# Patient Record
Sex: MALE | Race: WHITE | Employment: FULL TIME | URBAN - METROPOLITAN AREA
[De-identification: names, ages, dates, MRNs, and addresses within clinical notes are randomized per-mention and may not be internally consistent; named-entity substitution may affect disease eponyms.]

---

## 2017-01-07 ENCOUNTER — APPOINTMENT (EMERGENCY)
Dept: RADIOLOGY | Facility: HOSPITAL | Age: 42
End: 2017-01-07
Payer: COMMERCIAL

## 2017-01-07 ENCOUNTER — HOSPITAL ENCOUNTER (EMERGENCY)
Facility: HOSPITAL | Age: 42
Discharge: HOME | End: 2017-01-07
Attending: EMERGENCY MEDICINE
Payer: COMMERCIAL

## 2017-01-07 VITALS
SYSTOLIC BLOOD PRESSURE: 122 MMHG | HEART RATE: 88 BPM | TEMPERATURE: 98.6 F | OXYGEN SATURATION: 97 % | DIASTOLIC BLOOD PRESSURE: 55 MMHG | RESPIRATION RATE: 16 BRPM

## 2017-01-07 DIAGNOSIS — R07.9 CHEST PAIN: Primary | ICD-10-CM

## 2017-01-07 LAB
ANION GAP SERPL CALCULATED.3IONS-SCNC: 7 MMOL/L (ref 4–13)
ATRIAL RATE: 89 BPM
BASOPHILS # BLD AUTO: 0.03 THOUSANDS/ΜL (ref 0–0.1)
BASOPHILS NFR BLD AUTO: 0 % (ref 0–1)
BUN SERPL-MCNC: 9 MG/DL (ref 5–25)
CALCIUM SERPL-MCNC: 9.1 MG/DL (ref 8.3–10.1)
CHLORIDE SERPL-SCNC: 104 MMOL/L (ref 100–108)
CO2 SERPL-SCNC: 27 MMOL/L (ref 21–32)
CREAT SERPL-MCNC: 0.78 MG/DL (ref 0.6–1.3)
EOSINOPHIL # BLD AUTO: 0.12 THOUSAND/ΜL (ref 0–0.61)
EOSINOPHIL NFR BLD AUTO: 1 % (ref 0–6)
ERYTHROCYTE [DISTWIDTH] IN BLOOD BY AUTOMATED COUNT: 13.3 % (ref 11.6–15.1)
GFR SERPL CREATININE-BSD FRML MDRD: >60 ML/MIN/1.73SQ M
GLUCOSE SERPL-MCNC: 146 MG/DL (ref 65–140)
HCT VFR BLD AUTO: 42 % (ref 36.5–49.3)
HGB BLD-MCNC: 14.3 G/DL (ref 12–17)
LYMPHOCYTES # BLD AUTO: 2.42 THOUSANDS/ΜL (ref 0.6–4.47)
LYMPHOCYTES NFR BLD AUTO: 29 % (ref 14–44)
MCH RBC QN AUTO: 30.6 PG (ref 26.8–34.3)
MCHC RBC AUTO-ENTMCNC: 34 G/DL (ref 31.4–37.4)
MCV RBC AUTO: 90 FL (ref 82–98)
MONOCYTES # BLD AUTO: 0.57 THOUSAND/ΜL (ref 0.17–1.22)
MONOCYTES NFR BLD AUTO: 7 % (ref 4–12)
NEUTROPHILS # BLD AUTO: 5.32 THOUSANDS/ΜL (ref 1.85–7.62)
NEUTS SEG NFR BLD AUTO: 63 % (ref 43–75)
NRBC BLD AUTO-RTO: 0 /100 WBCS
P AXIS: 56 DEGREES
PLATELET # BLD AUTO: 203 THOUSANDS/UL (ref 149–390)
PMV BLD AUTO: 10.7 FL (ref 8.9–12.7)
POTASSIUM SERPL-SCNC: 3.7 MMOL/L (ref 3.5–5.3)
PR INTERVAL: 194 MS
QRS AXIS: -71 DEGREES
QRSD INTERVAL: 112 MS
QT INTERVAL: 368 MS
QTC INTERVAL: 442 MS
RBC # BLD AUTO: 4.67 MILLION/UL (ref 3.88–5.62)
SODIUM SERPL-SCNC: 138 MMOL/L (ref 136–145)
SPECIMEN SOURCE: NORMAL
T WAVE AXIS: 76 DEGREES
TROPONIN I BLD-MCNC: 0 NG/ML (ref 0–0.08)
VENTRICULAR RATE: 87 BPM
WBC # BLD AUTO: 8.49 THOUSAND/UL (ref 4.31–10.16)

## 2017-01-07 PROCEDURE — 93005 ELECTROCARDIOGRAM TRACING: CPT

## 2017-01-07 PROCEDURE — 80048 BASIC METABOLIC PNL TOTAL CA: CPT

## 2017-01-07 PROCEDURE — 36415 COLL VENOUS BLD VENIPUNCTURE: CPT

## 2017-01-07 PROCEDURE — 84484 ASSAY OF TROPONIN QUANT: CPT

## 2017-01-07 PROCEDURE — 71275 CT ANGIOGRAPHY CHEST: CPT

## 2017-01-07 PROCEDURE — 85025 COMPLETE CBC W/AUTO DIFF WBC: CPT

## 2017-01-07 PROCEDURE — 71020 HB CHEST X-RAY 2VW FRONTAL&LATL: CPT

## 2017-01-07 PROCEDURE — 99285 EMERGENCY DEPT VISIT HI MDM: CPT

## 2017-01-07 PROCEDURE — 74175 CTA ABDOMEN W/CONTRAST: CPT

## 2017-01-07 RX ORDER — NAPROXEN 500 MG/1
500 TABLET ORAL 2 TIMES DAILY WITH MEALS
Qty: 14 TABLET | Refills: 0 | Status: SHIPPED | OUTPATIENT
Start: 2017-01-07 | End: 2020-01-07 | Stop reason: ALTCHOICE

## 2017-01-07 RX ADMIN — IOHEXOL 100 ML: 350 INJECTION, SOLUTION INTRAVENOUS at 15:43

## 2020-01-14 PROCEDURE — 87070 CULTURE OTHR SPECIMN AEROBIC: CPT | Performed by: OTOLARYNGOLOGY

## 2020-01-14 PROCEDURE — 87147 CULTURE TYPE IMMUNOLOGIC: CPT | Performed by: OTOLARYNGOLOGY

## 2020-01-14 PROCEDURE — 87205 SMEAR GRAM STAIN: CPT | Performed by: OTOLARYNGOLOGY

## 2020-01-14 PROCEDURE — 87077 CULTURE AEROBIC IDENTIFY: CPT | Performed by: OTOLARYNGOLOGY

## 2020-01-14 PROCEDURE — 87186 SC STD MICRODIL/AGAR DIL: CPT | Performed by: OTOLARYNGOLOGY

## 2021-03-30 DIAGNOSIS — Z23 ENCOUNTER FOR IMMUNIZATION: ICD-10-CM

## 2021-06-09 PROCEDURE — 87102 FUNGUS ISOLATION CULTURE: CPT | Performed by: OTOLARYNGOLOGY

## 2021-06-09 PROCEDURE — 87205 SMEAR GRAM STAIN: CPT | Performed by: OTOLARYNGOLOGY

## 2021-06-09 PROCEDURE — 87186 SC STD MICRODIL/AGAR DIL: CPT | Performed by: OTOLARYNGOLOGY

## 2021-06-09 PROCEDURE — 87070 CULTURE OTHR SPECIMN AEROBIC: CPT | Performed by: OTOLARYNGOLOGY

## 2021-06-09 PROCEDURE — 87107 FUNGI IDENTIFICATION MOLD: CPT | Performed by: OTOLARYNGOLOGY

## 2024-11-01 ENCOUNTER — HOSPITAL ENCOUNTER (INPATIENT)
Facility: HOSPITAL | Age: 49
LOS: 3 days | Discharge: HOME | DRG: 552 | End: 2024-11-04
Attending: EMERGENCY MEDICINE | Admitting: INTERNAL MEDICINE
Payer: COMMERCIAL

## 2024-11-01 ENCOUNTER — APPOINTMENT (EMERGENCY)
Dept: RADIOLOGY | Facility: HOSPITAL | Age: 49
DRG: 552 | End: 2024-11-01
Payer: COMMERCIAL

## 2024-11-01 DIAGNOSIS — M54.50 LOW BACK PAIN: ICD-10-CM

## 2024-11-01 DIAGNOSIS — M51.27 LUMBOSACRAL DISC HERNIATION: Primary | ICD-10-CM

## 2024-11-01 DIAGNOSIS — M54.16 LUMBAR RADICULOPATHY: ICD-10-CM

## 2024-11-01 PROBLEM — E78.5 DYSLIPIDEMIA: Status: ACTIVE | Noted: 2024-11-01

## 2024-11-01 PROBLEM — E11.9 DIABETES MELLITUS TYPE 2, CONTROLLED (HCC): Status: ACTIVE | Noted: 2024-11-01

## 2024-11-01 LAB
ALBUMIN SERPL BCG-MCNC: 4.3 G/DL (ref 3.5–5)
ALP SERPL-CCNC: 68 U/L (ref 34–104)
ALT SERPL W P-5'-P-CCNC: 19 U/L (ref 7–52)
ANION GAP SERPL CALCULATED.3IONS-SCNC: 7 MMOL/L (ref 4–13)
AST SERPL W P-5'-P-CCNC: 13 U/L (ref 13–39)
ATRIAL RATE: 69 BPM
BASOPHILS # BLD AUTO: 0.05 THOUSANDS/ΜL (ref 0–0.1)
BASOPHILS NFR BLD AUTO: 1 % (ref 0–1)
BILIRUB SERPL-MCNC: 0.45 MG/DL (ref 0.2–1)
BNP SERPL-MCNC: 47 PG/ML (ref 0–100)
BUN SERPL-MCNC: 20 MG/DL (ref 5–25)
CALCIUM SERPL-MCNC: 8.9 MG/DL (ref 8.4–10.2)
CARDIAC TROPONIN I PNL SERPL HS: <2 NG/L
CHLORIDE SERPL-SCNC: 103 MMOL/L (ref 96–108)
CO2 SERPL-SCNC: 25 MMOL/L (ref 21–32)
CREAT SERPL-MCNC: 0.66 MG/DL (ref 0.6–1.3)
EOSINOPHIL # BLD AUTO: 0.08 THOUSAND/ΜL (ref 0–0.61)
EOSINOPHIL NFR BLD AUTO: 1 % (ref 0–6)
ERYTHROCYTE [DISTWIDTH] IN BLOOD BY AUTOMATED COUNT: 12.8 % (ref 11.6–15.1)
EST. AVERAGE GLUCOSE BLD GHB EST-MCNC: 163 MG/DL
GFR SERPL CREATININE-BSD FRML MDRD: 111 ML/MIN/1.73SQ M
GLUCOSE SERPL-MCNC: 154 MG/DL (ref 65–140)
GLUCOSE SERPL-MCNC: 158 MG/DL (ref 65–140)
GLUCOSE SERPL-MCNC: 215 MG/DL (ref 65–140)
HBA1C MFR BLD: 7.3 %
HCT VFR BLD AUTO: 39.8 % (ref 36.5–49.3)
HGB BLD-MCNC: 13.6 G/DL (ref 12–17)
HOLD SPECIMEN: NORMAL
IMM GRANULOCYTES # BLD AUTO: 0.02 THOUSAND/UL (ref 0–0.2)
IMM GRANULOCYTES NFR BLD AUTO: 0 % (ref 0–2)
LYMPHOCYTES # BLD AUTO: 1.69 THOUSANDS/ΜL (ref 0.6–4.47)
LYMPHOCYTES NFR BLD AUTO: 24 % (ref 14–44)
MCH RBC QN AUTO: 31 PG (ref 26.8–34.3)
MCHC RBC AUTO-ENTMCNC: 34.2 G/DL (ref 31.4–37.4)
MCV RBC AUTO: 91 FL (ref 82–98)
MONOCYTES # BLD AUTO: 0.47 THOUSAND/ΜL (ref 0.17–1.22)
MONOCYTES NFR BLD AUTO: 7 % (ref 4–12)
NEUTROPHILS # BLD AUTO: 4.87 THOUSANDS/ΜL (ref 1.85–7.62)
NEUTS SEG NFR BLD AUTO: 67 % (ref 43–75)
NRBC BLD AUTO-RTO: 0 /100 WBCS
P AXIS: 58 DEGREES
PLATELET # BLD AUTO: 186 THOUSANDS/UL (ref 149–390)
PLATELET # BLD AUTO: 193 THOUSANDS/UL (ref 149–390)
PMV BLD AUTO: 10.3 FL (ref 8.9–12.7)
PMV BLD AUTO: 9.9 FL (ref 8.9–12.7)
POTASSIUM SERPL-SCNC: 4.1 MMOL/L (ref 3.5–5.3)
PR INTERVAL: 232 MS
PROT SERPL-MCNC: 7 G/DL (ref 6.4–8.4)
QRS AXIS: -64 DEGREES
QRSD INTERVAL: 110 MS
QT INTERVAL: 398 MS
QTC INTERVAL: 426 MS
RBC # BLD AUTO: 4.39 MILLION/UL (ref 3.88–5.62)
SODIUM SERPL-SCNC: 135 MMOL/L (ref 135–147)
T WAVE AXIS: 44 DEGREES
VENTRICULAR RATE: 69 BPM
WBC # BLD AUTO: 7.18 THOUSAND/UL (ref 4.31–10.16)

## 2024-11-01 PROCEDURE — 85049 AUTOMATED PLATELET COUNT: CPT | Performed by: INTERNAL MEDICINE

## 2024-11-01 PROCEDURE — 74174 CTA ABD&PLVS W/CONTRAST: CPT

## 2024-11-01 PROCEDURE — 99285 EMERGENCY DEPT VISIT HI MDM: CPT | Performed by: EMERGENCY MEDICINE

## 2024-11-01 PROCEDURE — 93010 ELECTROCARDIOGRAM REPORT: CPT | Performed by: INTERNAL MEDICINE

## 2024-11-01 PROCEDURE — 83036 HEMOGLOBIN GLYCOSYLATED A1C: CPT | Performed by: INTERNAL MEDICINE

## 2024-11-01 PROCEDURE — 84484 ASSAY OF TROPONIN QUANT: CPT | Performed by: INTERNAL MEDICINE

## 2024-11-01 PROCEDURE — 71275 CT ANGIOGRAPHY CHEST: CPT

## 2024-11-01 PROCEDURE — 80053 COMPREHEN METABOLIC PANEL: CPT

## 2024-11-01 PROCEDURE — 83880 ASSAY OF NATRIURETIC PEPTIDE: CPT

## 2024-11-01 PROCEDURE — 82948 REAGENT STRIP/BLOOD GLUCOSE: CPT

## 2024-11-01 PROCEDURE — 99284 EMERGENCY DEPT VISIT MOD MDM: CPT

## 2024-11-01 PROCEDURE — 36415 COLL VENOUS BLD VENIPUNCTURE: CPT

## 2024-11-01 PROCEDURE — 99223 1ST HOSP IP/OBS HIGH 75: CPT | Performed by: INTERNAL MEDICINE

## 2024-11-01 PROCEDURE — 93005 ELECTROCARDIOGRAM TRACING: CPT

## 2024-11-01 PROCEDURE — 85025 COMPLETE CBC W/AUTO DIFF WBC: CPT

## 2024-11-01 PROCEDURE — 84484 ASSAY OF TROPONIN QUANT: CPT

## 2024-11-01 PROCEDURE — 96376 TX/PRO/DX INJ SAME DRUG ADON: CPT

## 2024-11-01 PROCEDURE — 96374 THER/PROPH/DIAG INJ IV PUSH: CPT

## 2024-11-01 RX ORDER — ACETAMINOPHEN 325 MG/1
975 TABLET ORAL EVERY 8 HOURS SCHEDULED
Status: DISCONTINUED | OUTPATIENT
Start: 2024-11-01 | End: 2024-11-04 | Stop reason: HOSPADM

## 2024-11-01 RX ORDER — HYDROMORPHONE HCL/PF 1 MG/ML
0.5 SYRINGE (ML) INJECTION ONCE
Status: COMPLETED | OUTPATIENT
Start: 2024-11-01 | End: 2024-11-01

## 2024-11-01 RX ORDER — METHOCARBAMOL 750 MG/1
750 TABLET, FILM COATED ORAL EVERY 8 HOURS SCHEDULED
Status: DISCONTINUED | OUTPATIENT
Start: 2024-11-01 | End: 2024-11-03

## 2024-11-01 RX ORDER — METHYLPREDNISOLONE 4 MG/1
16 TABLET ORAL DAILY
Status: COMPLETED | OUTPATIENT
Start: 2024-11-03 | End: 2024-11-03

## 2024-11-01 RX ORDER — ONDANSETRON 2 MG/ML
4 INJECTION INTRAMUSCULAR; INTRAVENOUS EVERY 6 HOURS PRN
Status: DISCONTINUED | OUTPATIENT
Start: 2024-11-01 | End: 2024-11-04 | Stop reason: HOSPADM

## 2024-11-01 RX ORDER — HYDROMORPHONE HCL/PF 1 MG/ML
1 SYRINGE (ML) INJECTION EVERY 4 HOURS PRN
Status: DISCONTINUED | OUTPATIENT
Start: 2024-11-01 | End: 2024-11-04

## 2024-11-01 RX ORDER — METHYLPREDNISOLONE 4 MG/1
4 TABLET ORAL DAILY
Status: DISCONTINUED | OUTPATIENT
Start: 2024-11-06 | End: 2024-11-04 | Stop reason: HOSPADM

## 2024-11-01 RX ORDER — INSULIN GLARGINE 100 [IU]/ML
10 INJECTION, SOLUTION SUBCUTANEOUS
Status: DISCONTINUED | OUTPATIENT
Start: 2024-11-01 | End: 2024-11-01

## 2024-11-01 RX ORDER — INSULIN GLARGINE 100 [IU]/ML
15 INJECTION, SOLUTION SUBCUTANEOUS
Status: DISCONTINUED | OUTPATIENT
Start: 2024-11-01 | End: 2024-11-04 | Stop reason: HOSPADM

## 2024-11-01 RX ORDER — INSULIN LISPRO 100 [IU]/ML
1-5 INJECTION, SOLUTION INTRAVENOUS; SUBCUTANEOUS
Status: DISCONTINUED | OUTPATIENT
Start: 2024-11-01 | End: 2024-11-02

## 2024-11-01 RX ORDER — ATORVASTATIN CALCIUM 10 MG/1
10 TABLET, FILM COATED ORAL
Status: DISCONTINUED | OUTPATIENT
Start: 2024-11-01 | End: 2024-11-04 | Stop reason: HOSPADM

## 2024-11-01 RX ORDER — METHYLPREDNISOLONE 4 MG/1
8 TABLET ORAL DAILY
Status: DISCONTINUED | OUTPATIENT
Start: 2024-11-05 | End: 2024-11-04 | Stop reason: HOSPADM

## 2024-11-01 RX ORDER — LIDOCAINE 50 MG/G
1 PATCH TOPICAL DAILY
Status: DISCONTINUED | OUTPATIENT
Start: 2024-11-01 | End: 2024-11-04 | Stop reason: HOSPADM

## 2024-11-01 RX ORDER — ASPIRIN 81 MG/1
81 TABLET ORAL DAILY
Status: DISCONTINUED | OUTPATIENT
Start: 2024-11-01 | End: 2024-11-04 | Stop reason: HOSPADM

## 2024-11-01 RX ORDER — ENOXAPARIN SODIUM 100 MG/ML
40 INJECTION SUBCUTANEOUS DAILY
Status: DISCONTINUED | OUTPATIENT
Start: 2024-11-01 | End: 2024-11-04 | Stop reason: HOSPADM

## 2024-11-01 RX ORDER — METHYLPREDNISOLONE 4 MG/1
20 TABLET ORAL DAILY
Status: COMPLETED | OUTPATIENT
Start: 2024-11-02 | End: 2024-11-02

## 2024-11-01 RX ORDER — OXYCODONE HYDROCHLORIDE 5 MG/1
5 TABLET ORAL EVERY 4 HOURS PRN
Status: DISCONTINUED | OUTPATIENT
Start: 2024-11-01 | End: 2024-11-02

## 2024-11-01 RX ORDER — METHYLPREDNISOLONE 4 MG/1
12 TABLET ORAL DAILY
Status: COMPLETED | OUTPATIENT
Start: 2024-11-04 | End: 2024-11-04

## 2024-11-01 RX ADMIN — IOHEXOL 100 ML: 350 INJECTION, SOLUTION INTRAVENOUS at 10:04

## 2024-11-01 RX ADMIN — ENOXAPARIN SODIUM 40 MG: 40 INJECTION SUBCUTANEOUS at 13:04

## 2024-11-01 RX ADMIN — HYDROMORPHONE HYDROCHLORIDE 1 MG: 1 INJECTION, SOLUTION INTRAMUSCULAR; INTRAVENOUS; SUBCUTANEOUS at 17:33

## 2024-11-01 RX ADMIN — Medication 7.5 MG: at 15:33

## 2024-11-01 RX ADMIN — METHOCARBAMOL 750 MG: 750 TABLET ORAL at 21:28

## 2024-11-01 RX ADMIN — ASPIRIN 81 MG: 81 TABLET, COATED ORAL at 13:03

## 2024-11-01 RX ADMIN — LIDOCAINE 1 PATCH: 50 PATCH TOPICAL at 13:04

## 2024-11-01 RX ADMIN — ATORVASTATIN CALCIUM 10 MG: 10 TABLET, FILM COATED ORAL at 16:15

## 2024-11-01 RX ADMIN — METHOCARBAMOL 750 MG: 750 TABLET ORAL at 13:03

## 2024-11-01 RX ADMIN — ACETAMINOPHEN 975 MG: 325 TABLET, FILM COATED ORAL at 13:03

## 2024-11-01 RX ADMIN — ONDANSETRON 4 MG: 2 INJECTION INTRAMUSCULAR; INTRAVENOUS at 21:35

## 2024-11-01 RX ADMIN — INSULIN LISPRO 1 UNITS: 100 INJECTION, SOLUTION INTRAVENOUS; SUBCUTANEOUS at 13:04

## 2024-11-01 RX ADMIN — HYDROMORPHONE HYDROCHLORIDE 0.5 MG: 1 INJECTION, SOLUTION INTRAMUSCULAR; INTRAVENOUS; SUBCUTANEOUS at 11:22

## 2024-11-01 RX ADMIN — HYDROMORPHONE HYDROCHLORIDE 0.5 MG: 1 INJECTION, SOLUTION INTRAMUSCULAR; INTRAVENOUS; SUBCUTANEOUS at 09:52

## 2024-11-01 RX ADMIN — ACETAMINOPHEN 975 MG: 325 TABLET, FILM COATED ORAL at 21:28

## 2024-11-01 RX ADMIN — INSULIN GLARGINE 15 UNITS: 100 INJECTION, SOLUTION SUBCUTANEOUS at 21:28

## 2024-11-01 RX ADMIN — Medication 7.5 MG: at 21:28

## 2024-11-01 RX ADMIN — CHOLECALCIFEROL TAB 10 MCG (400 UNIT) 400 UNITS: 10 TAB at 16:15

## 2024-11-01 RX ADMIN — METHYLPREDNISOLONE 24 MG: 16 TABLET ORAL at 16:15

## 2024-11-01 NOTE — ASSESSMENT & PLAN NOTE
Patient presented with severe back pain and ambulatory dysfunction  No falls or trauma  Pain radiating to bilateral thigh R>L  CT scan with Moderate L5-S1 left paracentral disc protrusion abutting and posteriorly displacing the descending left S1 nerve root.    Admit patient to the hospital  Check lumbar spine MRI  Consult neurosurgery  PT OT eval  Pain control with muscle relaxants  Start Medrol Dosepak

## 2024-11-01 NOTE — ED ATTENDING ATTESTATION
11/1/2024  I, Yamileth Hernandez MD, saw and evaluated the patient. I have discussed the patient with the resident/non-physician practitioner and agree with the resident's/non-physician practitioner's findings, Plan of Care, and MDM as documented in the resident's/non-physician practitioner's note, except where noted. All available labs and Radiology studies were reviewed.  I was present for key portions of any procedure(s) performed by the resident/non-physician practitioner and I was immediately available to provide assistance.       At this point I agree with the current assessment done in the Emergency Department.  I have conducted an independent evaluation of this patient a history and physical is as follows:    OA: 48 y/o m with h/o DM with h/o bicuspid aortic valve s/p repair who presents with acute onset of lower back pain since Monday. Pain has intensified since that time with radiation to bilateral lower extremities as well as up into his chest. Difficulty ambulating since that time secondary to pain. Denies numbness/weakness/tingling. Denies changes in bowel or urinary patterns. No n/v. No overt chest pain. No falls/trauma. No fevers/chills. No relief with at home medications including motrin/tylenol/tramadol/ice/heat. Given worsening of sxms, presents to the ED at this time. PE, uncomfortable but nontoxic, VSS, NC/AT, MMM, neck supple/FROM, - midline ttp over C, T or L spine, RR, lungs CTAB, abd soft, mild ttp over lower abdomen without r/g, - mass, - edema, - calf ttp, intact pulses, + 5/5 strength and intact pulses b/l, intact patella reflexes, negative babinksi, AAO. A/p acute back pain, different from previous episodes of pain and now with worsening and difficulty ambulating. Still able to urinate, denies saddle anesthesia. Given history and now with radiation of pain, will obtain CT imaging, labs, pain control. If negative, plan for MRI. Admit    ED Course         Critical Care  Time  Procedures

## 2024-11-01 NOTE — PLAN OF CARE
Problem: Potential for Falls  Goal: Patient will remain free of falls  Description: INTERVENTIONS:  - Educate patient/family on patient safety including physical limitations  - Instruct patient to call for assistance with activity   - Consult OT/PT to assist with strengthening/mobility   - Keep Call bell within reach  - Keep bed low and locked with side rails adjusted as appropriate  - Keep care items and personal belongings within reach  - Initiate and maintain comfort rounds  - Make Fall Risk Sign visible to staff  - Offer Toileting every 2 Hours, in advance of nee  - Obtain necessary fall risk management equipment  - Apply yellow socks and bracelet for high fall risk patients  - Consider moving patient to room near nurses station  Outcome: Progressing

## 2024-11-01 NOTE — ASSESSMENT & PLAN NOTE
"Lab Results   Component Value Date    HGBA1C 6.4 (H) 05/22/2023       No results for input(s): \"POCGLU\" in the last 72 hours.    Blood Sugar Average: Last 72 hrs:    Hold oral metformin  And Lantus at bedtime  Start insulin sliding scale  Monitor    "

## 2024-11-01 NOTE — H&P
"H&P - Hospitalist   Name: Ihsan Gomez 49 y.o. male I MRN: [id number]  Unit/Bed#: ED 20 I Date of Admission: 11/1/2024   Date of Service: 11/1/2024 I Hospital Day: 0     Assessment & Plan  Lumbar radiculopathy  Patient presented with severe back pain and ambulatory dysfunction  No falls or trauma  Pain radiating to bilateral thigh R>L  CT scan with Moderate L5-S1 left paracentral disc protrusion abutting and posteriorly displacing the descending left S1 nerve root.    Admit patient to the hospital  Check lumbar spine MRI  Consult neurosurgery  PT OT eval  Pain control with muscle relaxants  Start Medrol Dosepak  Diabetes mellitus type 2, controlled (HCC)  Lab Results   Component Value Date    HGBA1C 6.4 (H) 05/22/2023       No results for input(s): \"POCGLU\" in the last 72 hours.    Blood Sugar Average: Last 72 hrs:    Hold oral metformin  And Lantus at bedtime  Start insulin sliding scale  Monitor    Dyslipidemia  Continue with statin      VTE Pharmacologic Prophylaxis: VTE Score: 4 Moderate Risk (Score 3-4) - Pharmacological DVT Prophylaxis Ordered: enoxaparin (Lovenox).  Code Status: Level 1 - Full Code   Discussion with family: Updated  (daughter) at bedside.    Anticipated Length of Stay: Patient will be admitted on an inpatient basis with an anticipated length of stay of greater than 2 midnights secondary to severe low back pain.    History of Present Illness   Chief Complaint:   Severe low back pain    Ihsan Gomez is a 49 y.o. male with a PMH of type 2 diabetes mellitus, hyperlipidemia, cutaneous T-cell lymphoma ,bicuspid aortic valve status post repair and aortic arch repair who presents with worsening severe low back pain radiated to bilateral thigh right > left.  No radiation below knees  Symptoms started 5 days ago, no fall or trauma, worse with sitting and standing  Patient denies any bladder/bowel dysfunction  Patient was seen by his PCP and prescribed some medications without " improvement    He was evaluated in the emergency room, he is afebrile without leukocytosis  Normal CBC and chemistry panel  CT scan with moderate L5-S1 left paracentral disc protrusion abutting and posteriorly displacing the descending left S1 nerve root.      Discussed with ED physician and neurosurgery    Review of Systems   Constitutional:  Negative for chills and fever.   HENT:  Negative for sore throat.    Respiratory:  Negative for chest tightness and shortness of breath.    Cardiovascular:  Negative for chest pain, palpitations and leg swelling.   Gastrointestinal:  Negative for abdominal pain, blood in stool, diarrhea, nausea and vomiting.   Endocrine: Negative for polyuria.   Genitourinary:  Negative for difficulty urinating and dysuria.   Musculoskeletal:  Positive for back pain and gait problem.   Neurological:  Negative for dizziness, speech difficulty and headaches.   All other systems reviewed and are negative.      Historical Information   Past Medical History:   Diagnosis Date    Cancer (HCC)     Cardiac disease     Ear problems      Past Surgical History:   Procedure Laterality Date    AORTIC ARCH REPAIR      CARDIAC SURGERY      ROTATOR CUFF REPAIR Left      Social History     Tobacco Use    Smoking status: Never    Smokeless tobacco: Never   Substance and Sexual Activity    Alcohol use: No    Drug use: No    Sexual activity: Not on file     E-Cigarette/Vaping     E-Cigarette/Vaping Substances     History reviewed. No pertinent family history.   Social History:  Marital Status: /Civil Union       Meds/Allergies   I have reviewed home medications with patient personally.  Prior to Admission medications    Medication Sig Start Date End Date Taking? Authorizing Provider   acetaminophen (TYLENOL) 500 mg tablet Take 1,000 mg by mouth Three times a day 1/2/20   Historical Provider, MD   amoxicillin (AMOXIL) 500 mg capsule TAKE 4 CAPSULES 1 HOUR PRIOR TO DENTAL CLEANINGS OR PROCEDURES.  Patient not  taking: Reported on 7/18/2023 9/12/22   Historical Provider, MD   aspirin (ECOTRIN LOW STRENGTH) 81 mg EC tablet Take by mouth    Historical Provider, MD   atorvastatin (LIPITOR) 10 mg tablet TAKE 1 TABLET BY MOUTH EVERY DAY AT NIGHT 5/24/23   Historical Provider, MD   clotrimazole 1 % external solution Instill 5 drops in the left ear twice daily. 7/18/23   Octavio Marie PA-C   Continuous Blood Gluc Sensor (FreeStyle Rita 3 Sensor) MISC  6/27/23   Historical Provider, MD   glucose blood test strip Check 1-2 times daily: before meals and occasionally 2 hours after eating. Bring glucometer to your visits for diabetes.  Patient not taking: Reported on 3/29/2024 4/13/23   Historical Provider, MD   Lancets 33G MISC Check glucose 1-2 times/day before eating and occasionally 2 hours after eating  Patient not taking: Reported on 3/29/2024 4/13/23   Historical Provider, MD   meloxicam (MOBIC) 15 mg tablet Take 15 mg by mouth daily 7/22/20   Historical Provider, MD   metFORMIN (GLUCOPHAGE) 500 mg tablet Take 1,000 mg by mouth 2 (two) times a day with meals 5/8/23   Historical Provider, MD   ofloxacin (FLOXIN) 0.3 % otic solution Administer 5 drops into the left ear 2 (two) times a day 6/3/21   Hammad Dukes MD   oxybutynin (DITROPAN) 5 mg tablet  3/20/23   Historical Provider, MD   Rybelsus 7 MG tablet Take 7 mg by mouth daily 5/19/23   Historical Provider, MD   Vitamin D, Cholecalciferol, 10 MCG (400 UNIT) TABS Take 400 Units by mouth daily    Historical Provider, MD     No Known Allergies    Objective :  Temp:  [97.3 °F (36.3 °C)] 97.3 °F (36.3 °C)  HR:  [64-88] 64  BP: (108-132)/(58-67) 108/67  Resp:  [18-20] 20  SpO2:  [95 %-97 %] 95 %  O2 Device: None (Room air)    Physical Exam  Vitals reviewed.   Constitutional:       Appearance: He is not ill-appearing.      Comments: Appears uncomfortable in pain   HENT:      Head: Normocephalic and atraumatic.      Mouth/Throat:      Mouth: Mucous membranes are moist.       Pharynx: No oropharyngeal exudate.   Eyes:      General: No scleral icterus.     Extraocular Movements: Extraocular movements intact.   Cardiovascular:      Rate and Rhythm: Normal rate and regular rhythm.      Pulses: Normal pulses.      Heart sounds: Normal heart sounds. No murmur heard.  Pulmonary:      Effort: Pulmonary effort is normal. No respiratory distress.      Breath sounds: Normal breath sounds. No wheezing.   Abdominal:      General: Bowel sounds are normal. There is no distension.      Palpations: Abdomen is soft.      Tenderness: There is no abdominal tenderness.   Musculoskeletal:      Cervical back: Normal range of motion and neck supple.      Right lower leg: No edema.      Left lower leg: No edema.      Comments: Normal lower extremity sensation  Decreased range of motion lower back due to pain  Positive straight leg raise test on the right   Skin:     General: Skin is warm and dry.   Neurological:      Mental Status: He is alert and oriented to person, place, and time.      Cranial Nerves: No cranial nerve deficit.   Psychiatric:         Mood and Affect: Mood normal.            Lines/Drains:            Lab Results: I have reviewed the following results:  Results from last 7 days   Lab Units 11/01/24  0912   WBC Thousand/uL 7.18   HEMOGLOBIN g/dL 13.6   HEMATOCRIT % 39.8   PLATELETS Thousands/uL 186   SEGS PCT % 67   LYMPHO PCT % 24   MONO PCT % 7   EOS PCT % 1     Results from last 7 days   Lab Units 11/01/24  0912   SODIUM mmol/L 135   POTASSIUM mmol/L 4.1   CHLORIDE mmol/L 103   CO2 mmol/L 25   BUN mg/dL 20   CREATININE mg/dL 0.66   ANION GAP mmol/L 7   CALCIUM mg/dL 8.9   ALBUMIN g/dL 4.3   TOTAL BILIRUBIN mg/dL 0.45   ALK PHOS U/L 68   ALT U/L 19   AST U/L 13   GLUCOSE RANDOM mg/dL 215*             Lab Results   Component Value Date    HGBA1C 6.4 (H) 05/22/2023    HGBA1C 11.1 (H) 02/20/2023           Imaging Results Review: I reviewed radiology reports from this admission including: CT  abdomen/pelvis.  Other Study Results Review: EKG was personally reviewed and my interpretation is: Sinus rhythm with first-degree AV block at 69 bpm..    Administrative Statements   I have spent a total time of 70 minutes in caring for this patient on the day of the visit/encounter including Counseling / Coordination of care, Documenting in the medical record, Reviewing / ordering tests, medicine, procedures  , Obtaining or reviewing history  , and Communicating with other healthcare professionals .    ** Please Note: This note has been constructed using a voice recognition system. **

## 2024-11-01 NOTE — ED PROCEDURE NOTE
Procedure  POC AAA US    Date/Time: 11/1/2024 10:33 AM    Performed by: Clemente Connor DO  Authorized by: Clemente Connor DO    Patient location:  ED  Performing Provider:  Resident  Other Assisting Provider: No    Procedure details:     Exam Type:  Diagnostic    Indications: back pain      Views Obtained:  Transverse proximal    Image quality: limited diagnostic      Image availability:  Images available in PACS  Findings:     Abdominal Aorta Findings: limited visualization of suprarenal aorta      Abdominal Aorta Findings comment:  Unable to visualize distal and middle aorta    Aneurysm (if present) comment:  No abnormalities spotted, limited study    Intra-abdominal fluid: not identified    Interpretation:     Aortic ultrasound impression: indeterminate    POC Cardiac US    Date/Time: 11/1/2024 10:33 AM    Performed by: Clemente Connor DO  Authorized by: Clemente Connor DO    Patient location:  ED  Other Assisting Provider: No    Procedure details:     Exam Type:  Diagnostic    Indications comment:  Back pain    Assessment / Evaluation for: cardiac function, pericardial effusion, inferior vena cava for fluid responsiveness and right heart strain (suspected pulmonary embolism)      Exam Type: initial exam      Image quality: limited diagnostic      Image availability:  Images available in PACS  Patient Details:     Cardiac Rhythm:  Regular    Mechanical ventilation: No    Cardiac findings:     Echo technique: limited 2D      Views obtained: parasternal long axis, parasternal short axis, subcostal and apical      Pericardial effusion: absent      Tamponade physiology: absent      Wall motion: normal      LV systolic function: normal      RV dilation: none    IVC findings:     IVC Size: indeterminate                     Clemente Connor DO  11/01/24 1045

## 2024-11-02 ENCOUNTER — APPOINTMENT (INPATIENT)
Dept: RADIOLOGY | Facility: HOSPITAL | Age: 49
DRG: 552 | End: 2024-11-02
Payer: COMMERCIAL

## 2024-11-02 PROBLEM — M54.50 LOW BACK PAIN: Status: ACTIVE | Noted: 2024-11-02

## 2024-11-02 LAB
GLUCOSE SERPL-MCNC: 127 MG/DL (ref 65–140)
GLUCOSE SERPL-MCNC: 154 MG/DL (ref 65–140)
GLUCOSE SERPL-MCNC: 165 MG/DL (ref 65–140)
GLUCOSE SERPL-MCNC: 178 MG/DL (ref 65–140)

## 2024-11-02 PROCEDURE — 72148 MRI LUMBAR SPINE W/O DYE: CPT

## 2024-11-02 PROCEDURE — 82948 REAGENT STRIP/BLOOD GLUCOSE: CPT

## 2024-11-02 PROCEDURE — 99232 SBSQ HOSP IP/OBS MODERATE 35: CPT | Performed by: FAMILY MEDICINE

## 2024-11-02 PROCEDURE — 99222 1ST HOSP IP/OBS MODERATE 55: CPT | Performed by: NEUROLOGICAL SURGERY

## 2024-11-02 RX ORDER — GABAPENTIN 100 MG/1
100 CAPSULE ORAL 2 TIMES DAILY
Status: DISCONTINUED | OUTPATIENT
Start: 2024-11-02 | End: 2024-11-04 | Stop reason: HOSPADM

## 2024-11-02 RX ORDER — HYDROMORPHONE HYDROCHLORIDE 4 MG/1
4 TABLET ORAL EVERY 4 HOURS PRN
Status: DISCONTINUED | OUTPATIENT
Start: 2024-11-02 | End: 2024-11-04

## 2024-11-02 RX ORDER — INSULIN LISPRO 100 [IU]/ML
1-5 INJECTION, SOLUTION INTRAVENOUS; SUBCUTANEOUS
Status: DISCONTINUED | OUTPATIENT
Start: 2024-11-02 | End: 2024-11-04 | Stop reason: HOSPADM

## 2024-11-02 RX ORDER — HYDROMORPHONE HYDROCHLORIDE 2 MG/1
2 TABLET ORAL EVERY 4 HOURS PRN
Status: DISCONTINUED | OUTPATIENT
Start: 2024-11-02 | End: 2024-11-04

## 2024-11-02 RX ORDER — LIDOCAINE 50 MG/G
1 PATCH TOPICAL DAILY
Status: DISCONTINUED | OUTPATIENT
Start: 2024-11-02 | End: 2024-11-02

## 2024-11-02 RX ADMIN — METHOCARBAMOL 750 MG: 750 TABLET ORAL at 21:44

## 2024-11-02 RX ADMIN — INSULIN LISPRO 1 UNITS: 100 INJECTION, SOLUTION INTRAVENOUS; SUBCUTANEOUS at 16:07

## 2024-11-02 RX ADMIN — HYDROMORPHONE HYDROCHLORIDE 1 MG: 1 INJECTION, SOLUTION INTRAMUSCULAR; INTRAVENOUS; SUBCUTANEOUS at 09:21

## 2024-11-02 RX ADMIN — INSULIN GLARGINE 15 UNITS: 100 INJECTION, SOLUTION SUBCUTANEOUS at 21:45

## 2024-11-02 RX ADMIN — ACETAMINOPHEN 975 MG: 325 TABLET, FILM COATED ORAL at 21:44

## 2024-11-02 RX ADMIN — ACETAMINOPHEN 975 MG: 325 TABLET, FILM COATED ORAL at 13:21

## 2024-11-02 RX ADMIN — METHYLPREDNISOLONE 20 MG: 4 TABLET ORAL at 08:18

## 2024-11-02 RX ADMIN — HYDROMORPHONE HYDROCHLORIDE 4 MG: 4 TABLET ORAL at 20:10

## 2024-11-02 RX ADMIN — ATORVASTATIN CALCIUM 10 MG: 10 TABLET, FILM COATED ORAL at 15:48

## 2024-11-02 RX ADMIN — ASPIRIN 81 MG: 81 TABLET, COATED ORAL at 08:19

## 2024-11-02 RX ADMIN — METHOCARBAMOL 750 MG: 750 TABLET ORAL at 05:58

## 2024-11-02 RX ADMIN — Medication 7.5 MG: at 05:58

## 2024-11-02 RX ADMIN — INSULIN LISPRO 1 UNITS: 100 INJECTION, SOLUTION INTRAVENOUS; SUBCUTANEOUS at 06:02

## 2024-11-02 RX ADMIN — HYDROMORPHONE HYDROCHLORIDE 1 MG: 1 INJECTION, SOLUTION INTRAMUSCULAR; INTRAVENOUS; SUBCUTANEOUS at 23:41

## 2024-11-02 RX ADMIN — CHOLECALCIFEROL TAB 10 MCG (400 UNIT) 400 UNITS: 10 TAB at 08:19

## 2024-11-02 RX ADMIN — METHOCARBAMOL 750 MG: 750 TABLET ORAL at 13:21

## 2024-11-02 RX ADMIN — Medication 7.5 MG: at 13:25

## 2024-11-02 RX ADMIN — INSULIN LISPRO 1 UNITS: 100 INJECTION, SOLUTION INTRAVENOUS; SUBCUTANEOUS at 23:22

## 2024-11-02 RX ADMIN — ENOXAPARIN SODIUM 40 MG: 40 INJECTION SUBCUTANEOUS at 08:19

## 2024-11-02 RX ADMIN — LIDOCAINE 1 PATCH: 50 PATCH TOPICAL at 08:19

## 2024-11-02 RX ADMIN — ACETAMINOPHEN 975 MG: 325 TABLET, FILM COATED ORAL at 05:58

## 2024-11-02 RX ADMIN — HYDROMORPHONE HYDROCHLORIDE 1 MG: 1 INJECTION, SOLUTION INTRAMUSCULAR; INTRAVENOUS; SUBCUTANEOUS at 15:47

## 2024-11-02 RX ADMIN — GABAPENTIN 100 MG: 100 CAPSULE ORAL at 16:07

## 2024-11-02 NOTE — ASSESSMENT & PLAN NOTE
Lab Results   Component Value Date    HGBA1C 7.3 (H) 11/01/2024       Recent Labs     11/01/24  1736 11/02/24  0537 11/02/24  1106 11/02/24  1536   POCGLU 158* 154* 127 178*       Blood Sugar Average: Last 72 hrs:  (P) 154.2  Hold oral metformin  And Lantus at bedtime  Start insulin sliding scale  Monitor

## 2024-11-02 NOTE — PHYSICAL THERAPY NOTE
Physical Therapy Cancellation Note    Patient's Name: Ihsan Gomez       11/02/24 1124   PT Last Visit   PT Visit Date 11/02/24   Note Type   Note type Cancelled Session   Additional Comments Pt pending neurosurgery consult and results of MRI lumbar spine. Will hold PT evaluation and follow as clinical course allows.       Yesenia Simmons, PT, DPT

## 2024-11-02 NOTE — PLAN OF CARE
Problem: Potential for Falls  Goal: Patient will remain free of falls  Description: INTERVENTIONS:  - Educate patient/family on patient safety including physical limitations  - Instruct patient to call for assistance with activity   - Consult OT/PT to assist with strengthening/mobility   - Keep Call bell within reach  - Keep bed low and locked with side rails adjusted as appropriate  - Keep care items and personal belongings within reach  - Initiate and maintain comfort rounds  - Make Fall Risk Sign visible to staff  - Offer Toileting every 2 Hours, in advance of need  - Obtain necessary fall risk management equipment:  - Apply yellow socks and bracelet for high fall risk patients  - Consider moving patient to room near nurses station  11/2/2024 0742 by Aurora Castorena RN  Outcome: Progressing  11/1/2024 1745 by Aurora Castorena RN  Outcome: Progressing     Problem: Prexisting or High Potential for Compromised Skin Integrity  Goal: Skin integrity is maintained or improved  Description: INTERVENTIONS:  - Identify patients at risk for skin breakdown  - Assess and monitor skin integrity  - Assess and monitor nutrition and hydration status  - Monitor labs   - Assess for incontinence   - Turn and reposition patient  - Assist with mobility/ambulation  - Relieve pressure over bony prominences  - Avoid friction and shearing  - Provide appropriate hygiene as needed including keeping skin clean and dry  - Evaluate need for skin moisturizer/barrier cream  - Collaborate with interdisciplinary team   - Patient/family teaching  - Consider wound care consult   11/2/2024 0742 by Aurora Castorena RN  Outcome: Progressing  11/1/2024 1745 by Aurora Castorena RN  Outcome: Progressing     Problem: PAIN - ADULT  Goal: Verbalizes/displays adequate comfort level or baseline comfort level  Description: Interventions:  - Encourage patient to monitor pain and request assistance  - Assess pain using appropriate pain scale  - Administer  analgesics based on type and severity of pain and evaluate response  - Implement non-pharmacological measures as appropriate and evaluate response  - Consider cultural and social influences on pain and pain management  - Notify physician/advanced practitioner if interventions unsuccessful or patient reports new pain  11/2/2024 0742 by Aurora Castorena RN  Outcome: Progressing  11/1/2024 1745 by Aurora Castorena RN  Outcome: Progressing     Problem: INFECTION - ADULT  Goal: Absence or prevention of progression during hospitalization  Description: INTERVENTIONS:  - Assess and monitor for signs and symptoms of infection  - Monitor lab/diagnostic results  - Monitor all insertion sites, i.e. indwelling lines, tubes, and drains  - Monitor endotracheal if appropriate and nasal secretions for changes in amount and color  - [place] appropriate cooling/warming therapies per order  - Administer medications as ordered  - Instruct and encourage patient and family to use good hand hygiene technique  - Identify and instruct in appropriate isolation precautions for identified infection/condition  11/2/2024 0742 by Aurora Castorena RN  Outcome: Progressing  11/1/2024 1745 by Aurora Castorena RN  Outcome: Progressing  Goal: Absence of fever/infection during neutropenic period  Description: INTERVENTIONS:  - Monitor WBC    Outcome: Progressing

## 2024-11-02 NOTE — PROGRESS NOTES
Progress Note - Hospitalist   Name: Ihsan Gomez 49 y.o. male I MRN: [id number]  Unit/Bed#: -01 I Date of Admission: 11/1/2024   Date of Service: 11/2/2024 I Hospital Day: 1     Assessment & Plan  Lumbar radiculopathy  Patient presented with severe back pain and ambulatory dysfunction  No falls or trauma  Pain radiating to bilateral thigh -left more than right  CT scan with Moderate L5-S1 left paracentral disc protrusion abutting and posteriorly displacing the descending left S1 nerve root.  Admit patient to the hospital  Check lumbar spine MRI-MRI reviewed  Consult neurosurgery-neurosurgery consultation appreciated.  No surgical intervention as per neurosurgery.  PT OT eval  Pain control with muscle relaxants/reported that oxycodone does not help with the pain.  Will change it in to p.o. Dilaudid.  APS consult   Start Medrol Dosepak  Diabetes mellitus type 2, controlled (HCC)  Lab Results   Component Value Date    HGBA1C 7.3 (H) 11/01/2024       Recent Labs     11/01/24  1736 11/02/24  0537 11/02/24  1106 11/02/24  1536   POCGLU 158* 154* 127 178*       Blood Sugar Average: Last 72 hrs:  (P) 154.2  Hold oral metformin  And Lantus at bedtime  Start insulin sliding scale  Monitor    Dyslipidemia  Continue with statin  Low back pain      VTE Pharmacologic Prophylaxis: VTE Score: 4 Moderate Risk (Score 3-4) - Pharmacological DVT Prophylaxis Ordered: enoxaparin (Lovenox).    Mobility:   Basic Mobility Inpatient Raw Score: 11  JH-HLM Goal: 4: Move to chair/commode  JH-HLM Achieved: 2: Bed activities/Dependent transfer  JH-HLM Goal achieved. Continue to encourage appropriate mobility.    Patient Centered Rounds: I performed bedside rounds with nursing staff today.   Discussions with Specialists or Other Care Team Provider:     Education and Discussions with Family / Patient: Multiple family members updated at bedside- all questions answered  Current Length of Stay: 1 day(s)  Current Patient Status: Inpatient    Certification Statement: The patient will continue to require additional inpatient hospital stay due to   uncontrolled  Code Status: Level 1 - Full Code    Subjective   Seen and examined.  Reported that oxycodone is not helping with the pain.  Dilaudid helps with the pain.  Surgery will be discussed with the patient about MRI and the inability to have any surgical intervention    Objective :  Temp:  [97.7 °F (36.5 °C)-99.5 °F (37.5 °C)] 99.5 °F (37.5 °C)  HR:  [69-86] 69  BP: (110-131)/(60-76) 131/73  Resp:  [16-20] 17  SpO2:  [93 %-95 %] 93 %  O2 Device: None (Room air)    Body mass index is 33.7 kg/m².     Input and Output Summary (last 24 hours):     Intake/Output Summary (Last 24 hours) at 11/2/2024 1738  Last data filed at 11/2/2024 1001  Gross per 24 hour   Intake 150 ml   Output 1050 ml   Net -900 ml       Physical Exam  Constitutional:       General: He is not in acute distress.  HENT:      Head: Normocephalic and atraumatic.      Nose: Nose normal.      Mouth/Throat:      Pharynx: No oropharyngeal exudate.   Eyes:      General: No scleral icterus.  Cardiovascular:      Rate and Rhythm: Normal rate and regular rhythm.      Heart sounds: No murmur heard.  Pulmonary:      Effort: No respiratory distress.   Abdominal:      General: There is no distension.      Tenderness: There is no abdominal tenderness.   Musculoskeletal:         General: No swelling. Normal range of motion.   Skin:     General: Skin is warm.      Coloration: Skin is not jaundiced.   Neurological:      Mental Status: He is alert and oriented to person, place, and time. Mental status is at baseline.      Cranial Nerves: No cranial nerve deficit.      Motor: No weakness.           Lines/Drains:              Lab Results: I have reviewed the following results:   Results from last 7 days   Lab Units 11/01/24  1315 11/01/24  0912   WBC Thousand/uL  --  7.18   HEMOGLOBIN g/dL  --  13.6   HEMATOCRIT %  --  39.8   PLATELETS Thousands/uL 193 186    SEGS PCT %  --  67   LYMPHO PCT %  --  24   MONO PCT %  --  7   EOS PCT %  --  1     Results from last 7 days   Lab Units 11/01/24  0912   SODIUM mmol/L 135   POTASSIUM mmol/L 4.1   CHLORIDE mmol/L 103   CO2 mmol/L 25   BUN mg/dL 20   CREATININE mg/dL 0.66   ANION GAP mmol/L 7   CALCIUM mg/dL 8.9   ALBUMIN g/dL 4.3   TOTAL BILIRUBIN mg/dL 0.45   ALK PHOS U/L 68   ALT U/L 19   AST U/L 13   GLUCOSE RANDOM mg/dL 215*         Results from last 7 days   Lab Units 11/02/24  1536 11/02/24  1106 11/02/24  0537 11/01/24  1736 11/01/24  1300   POC GLUCOSE mg/dl 178* 127 154* 158* 154*     Results from last 7 days   Lab Units 11/01/24  1315   HEMOGLOBIN A1C % 7.3*           Recent Cultures (last 7 days):               Last 24 Hours Medication List:     Current Facility-Administered Medications:     acetaminophen (TYLENOL) tablet 975 mg, Q8H CURTIS    aspirin (ECOTRIN LOW STRENGTH) EC tablet 81 mg, Daily    atorvastatin (LIPITOR) tablet 10 mg, Daily With Dinner    Cholecalciferol (VITAMIN D3) tablet 400 Units, Daily    enoxaparin (LOVENOX) subcutaneous injection 40 mg, Daily    gabapentin (NEURONTIN) capsule 100 mg, BID    HYDROmorphone (DILAUDID) injection 1 mg, Q4H PRN    HYDROmorphone (DILAUDID) tablet 2 mg, Q4H PRN    HYDROmorphone (DILAUDID) tablet 4 mg, Q4H PRN    insulin glargine (LANTUS) subcutaneous injection 15 Units 0.15 mL, HS    insulin lispro (HumALOG/ADMELOG) 100 units/mL subcutaneous injection 1-5 Units, 4x Daily (PC & HS) **AND** Fingerstick Glucose (POCT), 4x Daily AC and at bedtime    lidocaine (LIDODERM) 5 % patch 1 patch, Daily    methocarbamol (ROBAXIN) tablet 750 mg, Q8H CURTIS    [COMPLETED] methylprednisolone (MEDROL) tablet 24 mg, Daily **FOLLOWED BY** [COMPLETED] methylprednisolone (MEDROL) tablet 20 mg, Daily **FOLLOWED BY** [START ON 11/3/2024] methylprednisolone (MEDROL) tablet 16 mg, Daily **FOLLOWED BY** [START ON 11/4/2024] methylprednisolone (MEDROL) tablet 12 mg, Daily **FOLLOWED BY** [START ON  11/5/2024] methylprednisolone (MEDROL) tablet 8 mg, Daily **FOLLOWED BY** [START ON 11/6/2024] methylprednisolone (MEDROL) tablet 4 mg, Daily    ondansetron (ZOFRAN) injection 4 mg, Q6H PRN    Administrative Statements   Today, Patient Was Seen By: Prerna Duckworth MD      **Please Note: This note may have been constructed using a voice recognition system.**

## 2024-11-02 NOTE — UTILIZATION REVIEW
Initial Clinical Review    Admission: Date/Time/Statement:   Admission Orders (From admission, onward)       Ordered        11/01/24 1206  Inpatient Admission  Once                          Orders Placed This Encounter   Procedures    Inpatient Admission     Standing Status:   Standing     Number of Occurrences:   1     Order Specific Question:   Level of Care     Answer:   Med Surg [16]     Order Specific Question:   Estimated length of stay     Answer:   More than 2 Midnights     Order Specific Question:   Certification     Answer:   I certify that inpatient services are medically necessary for this patient for a duration of greater than two midnights. See H&P and MD Progress Notes for additional information about the patient's course of treatment.     ED Arrival Information       Expected   -    Arrival   11/1/2024 08:24    Acuity   Emergent              Means of arrival   Walk-In    Escorted by   Family Member    Service   Hospitalist    Admission type   Emergency              Arrival complaint   Lower back pain             Chief Complaint   Patient presents with    Back Pain     Having low back pain since Monday. Was seen yesterday for same at doctors office and was prescribed multiple medications, but has only taken a tramadol without effect.       Initial Presentation: 49 y.o. male who presented self from home to [facility] ED. Admitted as Inpatient for evaluation and treatment of lumbar radiculopathy. PMHx: T2DM, HLD, cutaneous T-cell lymphoma. Presented w/ worsening severe low back pain radiating to b/l thighs R>L for past 5 days. Worse w/ sitting and standing.    EXAM: appears uncomfortable and in pain, decreased ROM lower back s/t pain, positive straight leg raise test on R. Imaging: CT showed L paracentral disc protrusion and displacing descending L S1 nerve root.   PLAN: check MRI lumbar spine, PT/OT evals, muscle relaxants, medrol dosepak, SSI w/ BG checks ACHS, Lantus,  continue PTA statin; I&O, up & OOB as tolerated. IV dilaudid 1 mg x1. IV Zofran x1. Neurosurgery consulted.    Anticipated Length of Stay/Certification Statement: Patient will be admitted on an inpatient basis with an anticipated length of stay of greater than 2 midnights secondary to severe low back pain.       Date: 11/02/24   Day 2: Pt notes oxycodone is not helping with pain, but Dilaudid is. Plan: PT/OT evals, analgesics, start medrol dosepak, change to PO dilaudid. SSI w/ BG checks ACHS, Lantus. Continue other current meds. Trend labs, replete electrolytes as needed. IV dilaudid 1 mg x3.     Neurosurgery: Pt w/ canal stenosis on MRI. No neurosurgical intervention. Recommend conservative management w/ multimodal pain regimen. PT outpatient. Refer to Pain management in outpatient setting. Check PVR x3.      ED Treatment-Medication Administration from 11/01/2024 0824 to 11/01/2024 1720         Date/Time Order Dose Route Action     11/01/2024 0952 HYDROmorphone (DILAUDID) injection 0.5 mg 0.5 mg Intravenous Given     11/01/2024 1004 iohexol (OMNIPAQUE) 350 MG/ML injection (MULTI-DOSE) 100 mL 100 mL Intravenous Given     11/01/2024 1122 HYDROmorphone (DILAUDID) injection 0.5 mg 0.5 mg Intravenous Given     11/01/2024 1303 aspirin (ECOTRIN LOW STRENGTH) EC tablet 81 mg 81 mg Oral Given     11/01/2024 1615 atorvastatin (LIPITOR) tablet 10 mg 10 mg Oral Given     11/01/2024 1615 Cholecalciferol (VITAMIN D3) tablet 400 Units 400 Units Oral Given     11/01/2024 1304 enoxaparin (LOVENOX) subcutaneous injection 40 mg 40 mg Subcutaneous Given     11/01/2024 1303 acetaminophen (TYLENOL) tablet 975 mg 975 mg Oral Given     11/01/2024 1615 methylprednisolone (MEDROL) tablet 24 mg 24 mg Oral Given     11/01/2024 1533 oxyCODONE (ROXICODONE) split tablet 7.5 mg 7.5 mg Oral Given     11/01/2024 1304 lidocaine (LIDODERM) 5 % patch 1 patch 1 patch Topical Medication Applied     11/01/2024 1303 methocarbamol (ROBAXIN) tablet 750 mg  750 mg Oral Given     11/01/2024 1304 insulin lispro (HumALOG/ADMELOG) 100 units/mL subcutaneous injection 1-5 Units 1 Units Subcutaneous Given            Scheduled Medications:  acetaminophen, 975 mg, Oral, Q8H CURTIS  aspirin, 81 mg, Oral, Daily  atorvastatin, 10 mg, Oral, Daily With Dinner  cholecalciferol, 400 Units, Oral, Daily  enoxaparin, 40 mg, Subcutaneous, Daily  insulin glargine, 15 Units, Subcutaneous, HS  insulin lispro, 1-5 Units, Subcutaneous, TID AC  lidocaine, 1 patch, Topical, Daily  methocarbamol, 750 mg, Oral, Q8H CURTIS  methylPREDNISolone, 20 mg, Oral, Daily   Followed by  [START ON 11/3/2024] methylPREDNISolone, 16 mg, Oral, Daily   Followed by  [START ON 11/4/2024] methylPREDNISolone, 12 mg, Oral, Daily   Followed by  [START ON 11/5/2024] methylPREDNISolone, 8 mg, Oral, Daily   Followed by  [START ON 11/6/2024] methylPREDNISolone, 4 mg, Oral, Daily    Continuous IV Infusions: none    PRN Meds:  HYDROmorphone, 1 mg, Intravenous, Q4H PRN; 11/1 x1, 11/2 x3  HYDROmorphone, 2 mg, Oral, Q4H PRN  HYDROmorphone, 4 mg, Oral, Q4H PRN; 11/2 x1  ondansetron, 4 mg, Intravenous, Q6H PRN; 11/1 x1  oxyCODONE, 5 mg, Oral, Q4H PRN then discontinued  oxyCODONE, 7.5 mg, Oral, Q4H PRN; 11/1 x2, 11/2 x1 then discontinued      ED Triage Vitals [11/01/24 0829]   Temperature Pulse Respirations Blood Pressure SpO2 Pain Score   (!) 97.3 °F (36.3 °C) 88 18 132/66 97 % 10 - Worst Possible Pain     Weight (last 2 days)       Date/Time Weight    11/01/24 17:33:49 104 (228.18)            Vital Signs (last 3 days)       Date/Time Temp Pulse Resp BP MAP (mmHg) SpO2 O2 Device Patient Position - Orthostatic VS Rudy Coma Scale Score Pain    11/02/24 0921 -- -- -- -- -- -- -- -- -- 10 - Worst Possible Pain    11/02/24 0738 -- -- -- -- -- -- None (Room air) -- 15 --    11/02/24 07:27:20 97.9 °F (36.6 °C) 74 16 110/60 77 94 % -- -- -- --    11/02/24 0558 -- -- -- -- -- -- -- -- -- 8    11/02/24 03:08:13 98.3 °F (36.8 °C) 86 18 124/76  92 93 % -- -- -- --    11/01/24 2300 -- -- -- -- -- -- None (Room air) -- 15 No Pain    11/01/24 21:43:45 97.7 °F (36.5 °C) 72 20 113/70 84 95 % -- -- -- --    11/01/24 2128 -- -- -- -- -- -- -- -- -- 7    11/01/24 1753 -- -- -- -- -- -- None (Room air) -- 15 --    11/01/24 17:33:49 97.3 °F (36.3 °C) 67 16 126/82 97 96 % -- -- -- 10 - Worst Possible Pain    11/01/24 1733 -- -- -- -- -- -- -- -- -- 10 - Worst Possible Pain    11/01/24 1617 -- 65 18 110/58 -- 95 % -- -- -- --    11/01/24 1533 -- -- -- -- -- -- -- -- -- 8    11/01/24 1303 -- -- -- -- -- -- -- -- -- 6    11/01/24 1122 -- -- -- -- -- -- -- -- -- 8    11/01/24 1051 -- 64 20 108/67 81 95 % -- -- -- --    11/01/24 0952 -- -- -- -- -- -- -- -- -- 7    11/01/24 0927 -- -- -- -- -- -- -- -- 15 --    11/01/24 0915 -- 70 18 121/58 84 95 % -- Lying -- --    11/01/24 0910 -- 74 20 120/60 -- 95 % -- Lying -- --    11/01/24 0829 97.3 °F (36.3 °C) 88 18 132/66 -- 97 % None (Room air) Sitting -- 10 - Worst Possible Pain              Pertinent Labs/Diagnostic Test Results:   Radiology:  MRI lumbar spine wo contrast   Final Interpretation by Yovani Pelaez MD (11/02 0033)      Mild motion artifact is present.   - L5-S1: Left subarticular disc extrusion compresses left S1 descending nerve root resulting in mild canal stenosis. Consider consultation with spine surgery.   - Multilevel degenerative changes of lumbar spine with varying degrees of canal stenosis (mild L5-S1) and foraminal narrowing (severe left L5-S1; moderate right L5-S1), as detailed above.      The study was marked in EPIC for immediate notification.               Workstation performed: [id number]         CTA dissection protocol chest/abdomen/pelvis   Final Interpretation by Ry Rodriguez MD (11/01 1047)         1. Stable postoperative change reflecting ascending aortic hemiarch graft repair without evidence for thoracic or abdominal dissection, intramural hematoma, aneurysm, or  stenosis/occlusion.   2. Moderate L5-S1 left paracentral disc protrusion abutting and posteriorly displacing the descending left S1 nerve root. Correlate with patient's symptoms.   3. Additional findings as noted.      The study was marked in EPIC for immediate notification.               Workstation performed: [id number]           Cardiology:  ECG 12 lead   Final Result by Oziel Buckley MD (11/01 1024)    Age and gender specific ECG analysis    Sinus rhythm with 1st degree A-V block   Possible Left atrial enlargement   Left axis deviation   Pulmonary disease pattern   Incomplete right bundle branch block   Nonspecific T wave abnormality   Abnormal ECG      Confirmed by zOiel Buckley ([id number]) on 11/1/2024 10:24:53 AM                Results from last 7 days   Lab Units 11/01/24  1315 11/01/24  0912   WBC Thousand/uL  --  7.18   HEMOGLOBIN g/dL  --  13.6   HEMATOCRIT %  --  39.8   PLATELETS Thousands/uL 193 186   TOTAL NEUT ABS Thousands/µL  --  4.87         Results from last 7 days   Lab Units 11/01/24  0912   SODIUM mmol/L 135   POTASSIUM mmol/L 4.1   CHLORIDE mmol/L 103   CO2 mmol/L 25   ANION GAP mmol/L 7   BUN mg/dL 20   CREATININE mg/dL 0.66   EGFR ml/min/1.73sq m 111   CALCIUM mg/dL 8.9     Results from last 7 days   Lab Units 11/01/24  0912   AST U/L 13   ALT U/L 19   ALK PHOS U/L 68   TOTAL PROTEIN g/dL 7.0   ALBUMIN g/dL 4.3   TOTAL BILIRUBIN mg/dL 0.45     Results from last 7 days   Lab Units 11/02/24  1106 11/02/24  0537 11/01/24  1736 11/01/24  1300   POC GLUCOSE mg/dl 127 154* 158* 154*     Results from last 7 days   Lab Units 11/01/24  0912   GLUCOSE RANDOM mg/dL 215*         Results from last 7 days   Lab Units 11/01/24  1315   HEMOGLOBIN A1C % 7.3*   EAG mg/dl 163     Results from last 7 days   Lab Units 11/01/24  1316 11/01/24  1123 11/01/24  0912   HS TNI 0HR ng/L  --   --  <2   HS TNI 2HR ng/L  --  <2  --    HS TNI 4HR ng/L <2  --   --      Results from last 7 days   Lab Units  11/01/24  0912   BNP pg/mL 47         Past Medical History:   Diagnosis Date    Cancer (HCC)     Cardiac disease     Ear problems        Admitting Diagnosis: Low back pain [M54.50]  Lumbar radiculopathy [M54.16]  Lower back pain [M54.50]  Lumbosacral disc herniation [M51.27]  Age/Sex: 49 y.o. male    Network Utilization Review Department  ATTENTION: Please call with any questions or concerns to [phone] and carefully listen to the prompts so that you are directed to the right person. All voicemails are confidential.   For Discharge needs, contact Care Management DC Support Team at [phone] opt. [phone]  Send all requests for admission clinical reviews, approved or denied determinations and any other requests to dedicated fax number below belonging to the campus where the patient is receiving treatment. List of dedicated fax numbers for the Facilities:  FACILITY NAME UR FAX NUMBER   ADMISSION DENIALS (Administrative/Medical Necessity) [phone]   DISCHARGE SUPPORT TEAM (NETWORK) [phone]   PARENT CHILD HEALTH (Maternity/NICU/Pediatrics) [phone]   [facility] [phone]   [facility] [phone]   [facility] [phone]   [facility] [phone]   [facility] [phone]   [facility] [phone]   [facility] [phone]   [facility] [phone]   [facility] [phone]   [facility] [phone]   [facility] [phone]   [facility] [phone]

## 2024-11-02 NOTE — CONSULTS
Consultation - Neurosurgery   Name: Ihsan Gomez 49 y.o. male I MRN: [id number]  Unit/Bed#: -01 I Date of Admission: 11/1/2024   Date of Service: 11/2/2024 I Hospital Day: 1   Inpatient consult to Neurosurgery  Consult performed by: MEDARDO Levine  Consult ordered by: Darian Alex DO        Physician Requesting Evaluation: Prerna Duckworth MD   Reason for Evaluation / Principal Problem: lumbar radic eval    Assessment & Plan  Lumbar radiculopathy    Diabetes mellitus type 2, controlled (HCC)  Lab Results   Component Value Date    HGBA1C 7.3 (H) 11/01/2024       Recent Labs     11/01/24  1300 11/01/24  1736 11/02/24  0537 11/02/24  1106   POCGLU 154* 158* 154* 127       Blood Sugar Average: Last 72 hrs:  (P) 148.25    Dyslipidemia    Low back pain  Low back pain radiating into left anterior thigh  Presented yesterday with worsening back pain radiating into left anterior thigh.  Patient states this started Monday morning, and has progressively worsened since then.  He states Wednesday he had to start using a cane due to pain and balance.  He saw his PCP on 10/31 who gave him a steroid injection as well as p.o. steroids with no relief.  He was then prescribed tramadol on Thursday.    Imaging:    MRI lumbar spine wo 11/2/24:Mild motion artifact is present.L5-S1: Left subarticular disc extrusion compresses left S1 descending nerve root resulting in mild canal stenosis. Consider consultation with spine surgery.Multilevel degenerative changes of lumbar spine with varying degrees of canal stenosis (mild L5-S1) and foraminal narrowing (severe left L5-S1; moderate right L5-S1), as detailed above.    Plan:  Continue frequent neurological checks.   Reviewed imaging extensively with patient and daughter at bedside, his symptoms do not correlate with MRI findings.  No neurosurgical intervention warranted.  Recommend conservative management with multimodal pain regimen.  Can consider APS consult and Medrol  Dosepak.  Recommend checking PVRs x3  Recommend outpatient referral to PT and pain management for possible SHEY  Medical management and pain control per primary team  DVT PPX: SCDs and Lovenox    Neurosurgery will follow from the periphery and if patient still here on Monday we will reevaluate, call with any further questions or concerns.      History of Present Illness   HPI: Ihsan Gomez is a 49 y.o.  male with past medical history significant for cardiac disease bicuspid aortic valve status post replacement, status post aortic arch graft and cancer.  Patient presented to the hospital yesterday morning with complaints of low back pain that started Monday, and has progressively worsened since that time.  On Wednesday he noted radiation into his bilateral thighs radiating up his back.  He denied any recent falls or traumas.  CT image to moderate L5-S1 left paracentral disc herniation for which we are consulted.    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  He states Wednesday he had to use a cane to help with his balance.  He saw his family doctor on 10/31 when he was given a steroid injection as well as oral steroids with no relief he then woke up the next day and called his doctor again because his symptoms did not improve and he was given tramadol then he presented to the hospital because his pain become unbearable.    Patient currently complaining of 8-9/10 low back pain which is constant and sharp which radiates into his left anterior thigh.  He also endorses numbness and tingling in his fingers and toes but if he moves them it goes away.  He states moving worsens it.  He states laying flat or taking his current pain regimen helps take the edge off.  He also  endorses occasional headaches.  He offers no other complaints.  He states he would like to avoid surgical intervention if possible.    Reviewed imaging with patient and daughter at bedside, patient symptoms do not correlate with MRI findings, recommend conservative management with pain control and outpatient referral to pain management for possible SHEY and PT.  Patient is agreeable to this.    Review of Systems   Constitutional:  Positive for activity change. Negative for chills and fever.   HENT:  Negative for tinnitus and trouble swallowing.    Eyes:  Negative for visual disturbance.   Respiratory:  Negative for shortness of breath.    Cardiovascular:  Negative for chest pain.   Gastrointestinal:  Negative for abdominal pain, constipation, diarrhea, nausea and vomiting.   Genitourinary:  Negative for difficulty urinating.   Musculoskeletal:  Positive for back pain and gait problem.   Neurological:  Positive for numbness and headaches. Negative for dizziness, speech difficulty, weakness and light-headedness.       Temp:  [97.3 °F (36.3 °C)-98.3 °F (36.8 °C)] 97.9 °F (36.6 °C)  HR:  [65-86] 74  BP: (110-126)/(58-82) 110/60  Resp:  [16-20] 16  SpO2:  [93 %-96 %] 94 %  O2 Device: None (Room air)    Physical Exam  Vitals reviewed.   Constitutional:       General: He is not in acute distress.     Appearance: Normal appearance. He is not ill-appearing.   HENT:      Head: Normocephalic and atraumatic.   Eyes:      Extraocular Movements: Extraocular movements intact.      Conjunctiva/sclera: Conjunctivae normal.      Pupils: Pupils are equal, round, and reactive to light.   Cardiovascular:      Rate and Rhythm: Normal rate.   Pulmonary:      Effort: Pulmonary effort is normal. No respiratory distress.   Chest:      Chest wall: No tenderness.   Abdominal:      General: There is no distension.      Palpations: Abdomen is soft.      Tenderness: There is no abdominal tenderness.   Musculoskeletal:         General: Normal range  of motion.      Cervical back: Normal range of motion and neck supple.   Skin:     General: Skin is warm and dry.   Neurological:      Mental Status: He is oriented to person, place, and time.      Deep Tendon Reflexes:      Reflex Scores:       Bicep reflexes are 1+ on the right side and 1+ on the left side.       Patellar reflexes are 1+ on the right side and 1+ on the left side.  Psychiatric:         Attention and Perception: Attention and perception normal.         Mood and Affect: Mood and affect normal.         Speech: Speech normal.         Behavior: Behavior normal. Behavior is cooperative.         Thought Content: Thought content normal.         Cognition and Memory: Cognition and memory normal.         Judgment: Judgment normal.      Neurological Exam  Mental Status  Awake, alert and oriented to person, place and time. Oriented to person, place, and time. Memory is normal. Speech is normal.    Cranial Nerves  CN III, IV, VI: Extraocular movements intact bilaterally. Pupils equal round and reactive to light bilaterally.  CN V: Facial sensation is normal.  CN VII: Full and symmetric facial movement.  CN VIII: Hearing is normal.  CN XI: Shoulder shrug strength is normal.  CN XII: Tongue midline without atrophy or fasciculations.    Motor  Normal muscle bulk throughout. Normal muscle tone.  Strength 5/5 throughout except bilateral hip flexion 4-4+/5 secondary to pain.    Sensory  Sensation is intact to light touch, pinprick, vibration and proprioception in all four extremities.    Reflexes                                            Right                      Left  Biceps                                 1+                         1+  Patellar                                1+                         1+    Right pathological reflexes: Criss's absent. Ankle clonus absent.  Left pathological reflexes: Criss's absent. Ankle clonus absent.        Lab Results: I have reviewed the following results:  Recent Labs      11/01/24  0912 11/01/24  1123 11/01/24  1315   WBC 7.18  --   --    HGB 13.6  --   --    HCT 39.8  --   --      --  193   SODIUM 135  --   --    K 4.1  --   --      --   --    CO2 25  --   --    BUN 20  --   --    CREATININE 0.66  --   --    GLUC 215*  --   --    AST 13  --   --    ALT 19  --   --    ALB 4.3  --   --    TBILI 0.45  --   --    ALKPHOS 68  --   --    HSTNI0 <2  --   --    HSTNI2  --  <2  --    BNP 47  --   --        Imaging Results Review: I personally reviewed the following image studies in PACS and associated radiology reports: MRI spine. My interpretation of the radiology images/reports is:  .      VTE Pharmacologic Prophylaxis: Sequential compression device (Venodyne)  and Enoxaparin (Lovenox)

## 2024-11-02 NOTE — PLAN OF CARE
Problem: Potential for Falls  Goal: Patient will remain free of falls  Description: INTERVENTIONS:  - Educate patient/family on patient safety including physical limitations  - Instruct patient to call for assistance with activity   - Consult OT/PT to assist with strengthening/mobility   - Keep Call bell within reach  - Keep bed low and locked with side rails adjusted as appropriate  - Keep care items and personal belongings within reach  - Initiate and maintain comfort rounds  - Make Fall Risk Sign visible to staff  - Offer Toileting every 2 Hours, in advance of need  - Initiate/Maintain bed/chair alarm  - Obtain necessary fall risk management equipment: nonskid footwear  - Apply yellow socks and bracelet for high fall risk patients  - Consider moving patient to room near nurses station  Outcome: Progressing     Problem: Prexisting or High Potential for Compromised Skin Integrity  Goal: Skin integrity is maintained or improved  Description: INTERVENTIONS:  - Identify patients at risk for skin breakdown  - Assess and monitor skin integrity  - Assess and monitor nutrition and hydration status  - Monitor labs   - Assess for incontinence   - Turn and reposition patient  - Assist with mobility/ambulation  - Relieve pressure over bony prominences  - Avoid friction and shearing  - Provide appropriate hygiene as needed including keeping skin clean and dry  - Evaluate need for skin moisturizer/barrier cream  - Collaborate with interdisciplinary team   - Patient/family teaching  - Consider wound care consult   Outcome: Progressing     Problem: PAIN - ADULT  Goal: Verbalizes/displays adequate comfort level or baseline comfort level  Description: Interventions:  - Encourage patient to monitor pain and request assistance  - Assess pain using appropriate pain scale  - Administer analgesics based on type and severity of pain and evaluate response  - Implement non-pharmacological measures as appropriate and evaluate response  -  Consider cultural and social influences on pain and pain management  - Notify physician/advanced practitioner if interventions unsuccessful or patient reports new pain  Outcome: Progressing     Problem: INFECTION - ADULT  Goal: Absence or prevention of progression during hospitalization  Description: INTERVENTIONS:  - Assess and monitor for signs and symptoms of infection  - Monitor lab/diagnostic results  - Monitor all insertion sites, i.e. indwelling lines, tubes, and drains  - Monitor endotracheal if appropriate and nasal secretions for changes in amount and color  - [place] appropriate cooling/warming therapies per order  - Administer medications as ordered  - Instruct and encourage patient and family to use good hand hygiene technique  - Identify and instruct in appropriate isolation precautions for identified infection/condition  Outcome: Progressing     Problem: SAFETY ADULT  Goal: Patient will remain free of falls  Description: INTERVENTIONS:  - Educate patient/family on patient safety including physical limitations  - Instruct patient to call for assistance with activity   - Consult OT/PT to assist with strengthening/mobility   - Keep Call bell within reach  - Keep bed low and locked with side rails adjusted as appropriate  - Keep care items and personal belongings within reach  - Initiate and maintain comfort rounds  - Make Fall Risk Sign visible to staff  - Offer Toileting every 2 Hours, in advance of need  - Initiate/Maintain bed/chair alarm  - Obtain necessary fall risk management equipment: nonskid footwear  - Apply yellow socks and bracelet for high fall risk patients  - Consider moving patient to room near nurses station  Outcome: Progressing  Goal: Maintain or return to baseline ADL function  Description: INTERVENTIONS:  -  Assess patient's ability to carry out ADLs; assess patient's baseline for ADL function and identify physical deficits which impact ability to perform ADLs (bathing, care of  mouth/teeth, toileting, grooming, dressing, etc.)  - Assess/evaluate cause of self-care deficits   - Assess range of motion  - Assess patient's mobility; develop plan if impaired  - Assess patient's need for assistive devices and provide as appropriate  - Encourage maximum independence but intervene and supervise when necessary  - Involve family in performance of ADLs  - Assess for home care needs following discharge   - Consider OT consult to assist with ADL evaluation and planning for discharge  - Provide patient education as appropriate  Outcome: Progressing  Goal: Maintains/Returns to pre admission functional level  Description: INTERVENTIONS:  - Perform AM-PAC 6 Click Basic Mobility/ Daily Activity assessment daily.  - Set and communicate daily mobility goal to care team and patient/family/caregiver.   - Collaborate with rehabilitation services on mobility goals if consulted  - Perform Range of Motion 3 times a day.  - Reposition patient every 2 hours.  - Dangle patient 3 times a day  - Stand patient 3 times a day  - Ambulate patient 3 times a day  - Out of bed to chair 3 times a day   - Out of bed for meals 3 times a day  - Out of bed for toileting  - Record patient progress and toleration of activity level   Outcome: Progressing     Problem: DISCHARGE PLANNING  Goal: Discharge to home or other facility with appropriate resources  Description: INTERVENTIONS:  - Identify barriers to discharge w/patient and caregiver  - Arrange for needed discharge resources and transportation as appropriate  - Identify discharge learning needs (meds, wound care, etc.)  - Arrange for interpretive services to assist at discharge as needed  - Refer to Case Management Department for coordinating discharge planning if the patient needs post-hospital services based on physician/advanced practitioner order or complex needs related to functional status, cognitive ability, or social support system  Outcome: Progressing     Problem:  Knowledge Deficit  Goal: Patient/family/caregiver demonstrates understanding of disease process, treatment plan, medications, and discharge instructions  Description: Complete learning assessment and assess knowledge base.  Interventions:  - Provide teaching at level of understanding  - Provide teaching via preferred learning methods  Outcome: Progressing

## 2024-11-02 NOTE — ASSESSMENT & PLAN NOTE
Patient presented with severe back pain and ambulatory dysfunction  No falls or trauma  Pain radiating to bilateral thigh -left more than right  CT scan with Moderate L5-S1 left paracentral disc protrusion abutting and posteriorly displacing the descending left S1 nerve root.  Admit patient to the hospital  Check lumbar spine MRI-MRI reviewed  Consult neurosurgery-neurosurgery consultation appreciated.  No surgical intervention as per neurosurgery.  PT OT eval  Pain control with muscle relaxants/reported that oxycodone does not help with the pain.  Will change it in to p.o. Dilaudid.  APS consult   Start Medrol Dosepak

## 2024-11-02 NOTE — ED PROVIDER NOTES
Time reflects when diagnosis was documented in both MDM as applicable and the Disposition within this note       Time User Action Codes Description Comment    11/1/2024 12:07 PM Darian lAex Add [M54.16] Lumbar radiculopathy     11/1/2024 12:10 PM Lachelle Duarte Add [M54.50] Low back pain     11/1/2024 12:11 PM Lachelle Duarte Add [M51.27] Lumbosacral disc herniation     11/1/2024 12:11 PM Lachelle Duarte Modify [M54.16] Lumbar radiculopathy     11/1/2024 12:11 PM Lachelle Duarte Modify [M51.27] Lumbosacral disc herniation           ED Disposition       ED Disposition   Admit    Condition   Stable    Date/Time   Fri Nov 1, 2024 12:10 PM    Comment                  Assessment & Plan       Medical Decision Making  Amount and/or Complexity of Data Reviewed  Labs: ordered. Decision-making details documented in ED Course.  Radiology: ordered. Decision-making details documented in ED Course.    Risk  Prescription drug management.  Decision regarding hospitalization.        ED Course as of 11/01/24 2030 Fri Nov 01, 2024   0900 Patient seen and evaluated by me  DDx: Given patient's history of aortic grafting, and history-concern for aortic disease dissection/leaking aneurysm.  Otherwise differential includes musculoskeletal back pain, sciatica, pneumonia, ACS, doubt PE.  No signs or symptoms concerning for cauda equina, spinal epidural abscess.  Workup and plan: CBC, CMP, BNP, troponin, EKG, CTA dissection.   0914 EKG done at 0914 interpreted by me   rate 69  rhythm normal sinus   axis right deviation  QRS complexes are normal  Intervals are normal  ST segments showing no ischemic changes.      0925 CBC and differential  Within normal limits   0952 Comprehensive metabolic panel(!)  WNL   1007 BNP: 47   1054 CTA dissection protocol chest/abdomen/pelvis  IMPRESSION:        1. Stable postoperative change reflecting ascending aortic hemiarch graft repair without evidence for thoracic or abdominal dissection, intramural  hematoma, aneurysm, or stenosis/occlusion.  2. Moderate L5-S1 left paracentral disc protrusion abutting and posteriorly displacing the descending left S1 nerve root. Correlate with patient's symptoms.  3. Additional findings as noted.     1130 Patient's bladder is significantly distended on CT scan.  He is still denying sensation of urinary retention.  Postvoid residual 173mL.   1203 Admitted to [facility]   aspirin (ECOTRIN LOW STRENGTH) EC tablet 81 mg (81 mg Oral Given 11/1/24 1303)   atorvastatin (LIPITOR) tablet 10 mg (10 mg Oral Given 11/1/24 1615)   Cholecalciferol (VITAMIN D3) tablet 400 Units (400 Units Oral Given 11/1/24 1615)   ondansetron (ZOFRAN) injection 4 mg (has no administration in time range)   enoxaparin (LOVENOX) subcutaneous injection 40 mg (40 mg Subcutaneous Given 11/1/24 1304)   acetaminophen (TYLENOL) tablet 975 mg (975 mg Oral Given 11/1/24 1303)   methylprednisolone (MEDROL) tablet 24 mg (24 mg Oral Given 11/1/24 1615)     Followed by   methylprednisolone (MEDROL) tablet 20 mg (has no administration in time range)     Followed by   methylprednisolone (MEDROL) tablet 16 mg (has no administration in time range)     Followed by   methylprednisolone (MEDROL) tablet 12 mg (has no administration in time range)     Followed by   methylprednisolone (MEDROL) tablet 8 mg (has no administration in time range)     Followed by   methylprednisolone (MEDROL) tablet 4 mg (has no administration in time range)   oxyCODONE (ROXICODONE) IR tablet 5 mg (has no administration in time range)   oxyCODONE (ROXICODONE) split tablet 7.5 mg (7.5 mg Oral Given 11/1/24 1533)   HYDROmorphone (DILAUDID) injection 1 mg (has no administration in time range)   lidocaine (LIDODERM) 5 % patch 1 patch (1 patch Topical Medication Applied 11/1/24 1304)   methocarbamol (ROBAXIN) tablet 750 mg (750 mg Oral Given 11/1/24 1303)   insulin lispro (HumALOG/ADMELOG) 100 units/mL subcutaneous injection 1-5 Units (1 Units  Subcutaneous Given 11/1/24 1304)   insulin glargine (LANTUS) subcutaneous injection 15 Units 0.15 mL (has no administration in time range)   HYDROmorphone (DILAUDID) injection 0.5 mg (0.5 mg Intravenous Given 11/1/24 0952)   iohexol (OMNIPAQUE) 350 MG/ML injection (MULTI-DOSE) 100 mL (100 mL Intravenous Given 11/1/24 1004)   HYDROmorphone (DILAUDID) injection 0.5 mg (0.5 mg Intravenous Given 11/1/24 1122)       ED Risk Strat Scores                           SBIRT 20yo+      Flowsheet Row Most Recent Value   Initial Alcohol Screen: US AUDIT-C     1. How often do you have a drink containing alcohol? 0 Filed at: 11/01/2024 0926   2. How many drinks containing alcohol do you have on a typical day you are drinking?  0 Filed at: 11/01/2024 0926   3a. Male UNDER 65: How often do you have five or more drinks on one occasion? 0 Filed at: 11/01/2024 0926   Audit-C Score 0 Filed at: 11/01/2024 0926   LUKASZ: How many times in the past year have you...    Used an illegal drug or used a prescription medication for non-medical reasons? Never Filed at: 11/01/2024 0926                            History of Present Illness       Chief Complaint   Patient presents with    Back Pain     Having low back pain since Monday. Was seen yesterday for same at doctors office and was prescribed multiple medications, but has only taken a tramadol without effect.       Past Medical History:   Diagnosis Date    Cancer (HCC)     Cardiac disease     Ear problems       Past Surgical History:   Procedure Laterality Date    AORTIC ARCH REPAIR      CARDIAC SURGERY      ROTATOR CUFF REPAIR Left       History reviewed. No pertinent family history.   Social History     Tobacco Use    Smoking status: Never     Passive exposure: Never    Smokeless tobacco: Never   Substance Use Topics    Alcohol use: Never    Drug use: Never      E-Cigarette/Vaping    E-Cigarette Use Never Assessed       E-Cigarette/Vaping Substances    Nicotine No     THC No     CBD No      Flavoring No     Other No     Unknown No       I have reviewed and agree with the history as documented.     Patient is a 49-year-old male, past medical history significant for diabetes, cutaneous carcinoma, bicuspid aortic valve status post replacement, status post aortic arch graft presenting today for evaluation of lower back pain.  Patient states that his lower back pain started on Monday this week.  He reports that it has been getting progressively worse every day.  On Wednesday, he reports it began to have radiation into his bilateral lower extremities, right worse than left.  He also does intermittently report some radiation up into his chest and upper extremities where he experiences some tingling sensation in his bilateral elbows.  Patient denies any abdominal pain.  He denies any weakness or sensation changes in his lower legs.  He denies any bowel or bladder retention or incontinence.  Denies saddle anesthesia.  He denies any recent fevers or otherwise recent illnesses.  He has no trauma inciting event.  He has no history of chronic lower back pain.  He states that he does have a history of sciatica, which feels nothing like the symptoms he has been experiencing this week.  Patient does have reports that he has been seen by his primary care doctor regarding the symptoms.  He was recently started on a steroid course and given tramadol.  He has been taking these medications at home, but pain progressively still keeps getting worse.          Review of Systems   Constitutional:  Negative for chills, fatigue and fever.   HENT:  Negative for congestion.    Respiratory:  Negative for cough, chest tightness and shortness of breath.    Cardiovascular:  Negative for chest pain and leg swelling.   Gastrointestinal:  Negative for abdominal distention, abdominal pain, constipation, diarrhea, nausea and vomiting.   Genitourinary:  Negative for decreased urine volume, difficulty urinating, dysuria, frequency and  urgency.   Musculoskeletal:  Positive for back pain.   Skin:  Negative for color change.   Neurological:  Negative for dizziness, syncope, weakness, numbness and headaches.           Objective       ED Triage Vitals [11/01/24 0829]   Temperature Pulse Blood Pressure Respirations SpO2 Patient Position - Orthostatic VS   (!) 97.3 °F (36.3 °C) 88 132/66 18 97 % Sitting      Temp Source Heart Rate Source BP Location FiO2 (%) Pain Score    Temporal Monitor Left arm -- 10 - Worst Possible Pain      Vitals      Date and Time Temp Pulse SpO2 Resp BP Pain Score FACES Pain Rating User   11/01/24 1733 -- -- -- 16 -- 10 - Worst Possible Pain -- AB   11/01/24 1733 97.3 °F (36.3 °C) 67 96 % -- 126/82 -- -- DII   11/01/24 1733 -- -- -- -- -- 10 - Worst Possible Pain -- AB   11/01/24 1617 -- 65 95 % 18 110/58 -- --    11/01/24 1533 -- -- -- -- -- 8 -- SM   11/01/24 1303 -- -- -- -- -- 6 --    11/01/24 1122 -- -- -- -- -- 8 --    11/01/24 1051 -- 64 95 % 20 108/67 -- --    11/01/24 0952 -- -- -- -- -- 7 --    11/01/24 0915 -- 70 95 % 18 121/58 -- --    11/01/24 0910 -- 74 95 % 20 120/60 -- --    11/01/24 0829 97.3 °F (36.3 °C) 88 97 % 18 132/66 10 - Worst Possible Pain -- ST            Physical Exam  Vitals and nursing note reviewed.   Constitutional:       General: He is not in acute distress.     Appearance: Normal appearance. He is not ill-appearing or toxic-appearing.   HENT:      Head: Normocephalic and atraumatic.   Eyes:      General: No scleral icterus.     Extraocular Movements: Extraocular movements intact.   Cardiovascular:      Rate and Rhythm: Normal rate and regular rhythm.      Pulses: Normal pulses.      Heart sounds: Normal heart sounds. No murmur heard.  Pulmonary:      Effort: Pulmonary effort is normal. No respiratory distress.      Breath sounds: Normal breath sounds. No wheezing or rhonchi.   Abdominal:      General: Abdomen is flat. There is no distension.      Palpations: Abdomen is soft. There  is no mass.      Tenderness: There is abdominal tenderness (left). There is no guarding or rebound.   Musculoskeletal:         General: No swelling or tenderness.      Cervical back: Normal and normal range of motion.      Thoracic back: Normal.      Lumbar back: No swelling, edema, deformity, signs of trauma, spasms, tenderness or bony tenderness. Positive left straight leg raise test.      Right lower leg: No edema.      Left lower leg: No edema.   Skin:     Capillary Refill: Capillary refill takes less than 2 seconds.   Neurological:      General: No focal deficit present.      Mental Status: He is alert.      Cranial Nerves: No cranial nerve deficit.      Sensory: No sensory deficit.      Motor: No weakness.      Gait: Gait normal.      Comments: Strength intact and equal throughout.  Sensation intact throughout, patient does subjectively report decreased sensation to light touch in his left lower extremity.   Psychiatric:         Mood and Affect: Mood normal.         Behavior: Behavior normal.         Results Reviewed       Procedure Component Value Units Date/Time    [place] draw [[id number]] Collected: 11/01/24 0915    Lab Status: Final result Specimen: Blood from Arm, Left Updated: 11/01/24 1410    Narrative:      The following orders were created for panel order [place] draw.  Procedure                               Abnormality         Status                     ---------                               -----------         ------                     Light Blue Top on hold[[id number]]                           Final result               Lavender Top 7ml on hold[[id number]]                         Final result                 Please view results for these tests on the individual orders.    Hemoglobin A1c w/EAG Estimation (Prechecked if no A1C within 90 days) [[id number]]  (Abnormal) Collected: 11/01/24 1315    Lab Status: Final result Specimen: Blood from Arm, Left Updated: 11/01/24 1356     Hemoglobin A1C 7.3 %        mg/dl     HS Troponin I 4hr [[id number]] Collected: 11/01/24 1316    Lab Status: Final result Specimen: Blood from Arm, Left Updated: 11/01/24 1347     hs TnI 4hr <2 ng/L      Delta 4hr hsTnI --    Platelet count [[id number]]  (Normal) Collected: 11/01/24 1315    Lab Status: Final result Specimen: Blood from Arm, Left Updated: 11/01/24 1329     Platelets 193 Thousands/uL      MPV 10.3 fL     Fingerstick Glucose (POCT) [[id number]]  (Abnormal) Collected: 11/01/24 1300    Lab Status: Final result Specimen: Blood Updated: 11/01/24 1300     POC Glucose 154 mg/dl     HS Troponin I 2hr [[id number]] Collected: 11/01/24 1123    Lab Status: Final result Specimen: Blood from Arm, Left Updated: 11/01/24 1200     hs TnI 2hr <2 ng/L      Delta 2hr hsTnI --    HS Troponin 0hr (reflex protocol) [[id number]]  (Normal) Collected: 11/01/24 0912    Lab Status: Final result Specimen: Blood from Arm, Left Updated: 11/01/24 1012     hs TnI 0hr <2 ng/L     B-Type Natriuretic Peptide(BNP) [[id number]]  (Normal) Collected: 11/01/24 0912    Lab Status: Final result Specimen: Blood from Arm, Left Updated: 11/01/24 0954     BNP 47 pg/mL     Comprehensive metabolic panel [[id number]]  (Abnormal) Collected: 11/01/24 0912    Lab Status: Final result Specimen: Blood from Arm, Left Updated: 11/01/24 0945     Sodium 135 mmol/L      Potassium 4.1 mmol/L      Chloride 103 mmol/L      CO2 25 mmol/L      ANION GAP 7 mmol/L      BUN 20 mg/dL      Creatinine 0.66 mg/dL      Glucose 215 mg/dL      Calcium 8.9 mg/dL      AST 13 U/L      ALT 19 U/L      Alkaline Phosphatase 68 U/L      Total Protein 7.0 g/dL      Albumin 4.3 g/dL      Total Bilirubin 0.45 mg/dL      eGFR 111 ml/min/1.73sq m     Narrative:      [employer] guidelines for Chronic Kidney Disease (CKD):     Stage 1 with normal or high GFR (GFR > 90 mL/min/1.73 square meters)    Stage 2 Mild CKD (GFR = 60-89 mL/min/1.73 square meters)    Stage 3A Moderate CKD (GFR =  45-59 mL/min/1.73 square meters)    Stage 3B Moderate CKD (GFR = 30-44 mL/min/1.73 square meters)    Stage 4 Severe CKD (GFR = 15-29 mL/min/1.73 square meters)    Stage 5 End Stage CKD (GFR <15 mL/min/1.73 square meters)  Note: GFR calculation is accurate only with a steady state creatinine    CBC and differential [[id number]] Collected: 11/01/24 0912    Lab Status: Final result Specimen: Blood from Arm, Left Updated: 11/01/24 0924     WBC 7.18 Thousand/uL      RBC 4.39 Million/uL      Hemoglobin 13.6 g/dL      Hematocrit 39.8 %      MCV 91 fL      MCH 31.0 pg      MCHC 34.2 g/dL      RDW 12.8 %      MPV 9.9 fL      Platelets 186 Thousands/uL      nRBC 0 /100 WBCs      Segmented % 67 %      Immature Grans % 0 %      Lymphocytes % 24 %      Monocytes % 7 %      Eosinophils Relative 1 %      Basophils Relative 1 %      Absolute Neutrophils 4.87 Thousands/µL      Absolute Immature Grans 0.02 Thousand/uL      Absolute Lymphocytes 1.69 Thousands/µL      Absolute Monocytes 0.47 Thousand/µL      Eosinophils Absolute 0.08 Thousand/µL      Basophils Absolute 0.05 Thousands/µL             CTA dissection protocol chest/abdomen/pelvis   Final Interpretation by Ry Rodriguez MD (11/01 1047)         1. Stable postoperative change reflecting ascending aortic hemiarch graft repair without evidence for thoracic or abdominal dissection, intramural hematoma, aneurysm, or stenosis/occlusion.   2. Moderate L5-S1 left paracentral disc protrusion abutting and posteriorly displacing the descending left S1 nerve root. Correlate with patient's symptoms.   3. Additional findings as noted.      The study was marked in EPIC for immediate notification.               Workstation performed: [id number]         MRI lumbar spine wo contrast    (Results Pending)       Procedures    ED Medication and Procedure Management   Prior to Admission Medications   Prescriptions Last Dose Informant Patient Reported? Taking?   Continuous Blood Gluc Sensor  (FreeStyle Rita 3 Sensor) MISC 11/1/2024  Yes Yes   Lancets 33G MISC 11/1/2024  Yes Yes   Rybelsus 7 MG tablet   Yes No   Sig: Take 7 mg by mouth daily   Vitamin D, Cholecalciferol, 10 MCG (400 UNIT) TABS 11/1/2024  Yes Yes   Sig: Take 400 Units by mouth daily   acetaminophen (TYLENOL) 500 mg tablet   Yes No   Sig: Take 1,000 mg by mouth Three times a day   amoxicillin (AMOXIL) 500 mg capsule   Yes No   Sig: TAKE 4 CAPSULES 1 HOUR PRIOR TO DENTAL CLEANINGS OR PROCEDURES.   Patient not taking: Reported on 7/18/2023   aspirin (ECOTRIN LOW STRENGTH) 81 mg EC tablet 11/1/2024  Yes Yes   Sig: Take by mouth   atorvastatin (LIPITOR) 10 mg tablet 11/1/2024  Yes Yes   Sig: TAKE 1 TABLET BY MOUTH EVERY DAY AT NIGHT   clotrimazole 1 % external solution   No No   Sig: Instill 5 drops in the left ear twice daily.   glucose blood test strip 11/1/2024  Yes Yes   meloxicam (MOBIC) 15 mg tablet   Yes No   Sig: Take 15 mg by mouth daily   metFORMIN (GLUCOPHAGE) 500 mg tablet 10/31/2024  Yes Yes   Sig: Take 1,000 mg by mouth 2 (two) times a day with meals   ofloxacin (FLOXIN) 0.3 % otic solution   No No   Sig: Administer 5 drops into the left ear 2 (two) times a day   oxybutynin (DITROPAN) 5 mg tablet   Yes No      Facility-Administered Medications: None     Current Discharge Medication List        CONTINUE these medications which have NOT CHANGED    Details   aspirin (ECOTRIN LOW STRENGTH) 81 mg EC tablet Take by mouth      atorvastatin (LIPITOR) 10 mg tablet TAKE 1 TABLET BY MOUTH EVERY DAY AT NIGHT      Continuous Blood Gluc Sensor (FreeStyle Rita 3 Sensor) MISC       glucose blood test strip       Lancets 33G MISC       metFORMIN (GLUCOPHAGE) 500 mg tablet Take 1,000 mg by mouth 2 (two) times a day with meals      Vitamin D, Cholecalciferol, 10 MCG (400 UNIT) TABS Take 400 Units by mouth daily      acetaminophen (TYLENOL) 500 mg tablet Take 1,000 mg by mouth Three times a day      amoxicillin (AMOXIL) 500 mg capsule TAKE 4  CAPSULES 1 HOUR PRIOR TO DENTAL CLEANINGS OR PROCEDURES.      clotrimazole 1 % external solution Instill 5 drops in the left ear twice daily.  Qty: 10 mL, Refills: 1    Associated Diagnoses: Otomycosis of left ear      meloxicam (MOBIC) 15 mg tablet Take 15 mg by mouth daily      ofloxacin (FLOXIN) 0.3 % otic solution Administer 5 drops into the left ear 2 (two) times a day  Qty: 5 mL, Refills: 1    Associated Diagnoses: Acute otitis externa of left ear, unspecified type      oxybutynin (DITROPAN) 5 mg tablet       Rybelsus 7 MG tablet Take 7 mg by mouth daily           No discharge procedures on file.  ED SEPSIS DOCUMENTATION   Time reflects when diagnosis was documented in both MDM as applicable and the Disposition within this note       Time User Action Codes Description Comment    11/1/2024 12:07 PM Darian Alex Add [M54.16] Lumbar radiculopathy     11/1/2024 12:10 PM Lachelle Duarte Add [M54.50] Low back pain     11/1/2024 12:11 PM Lachelle Duarte Add [M51.27] Lumbosacral disc herniation     11/1/2024 12:11 PM Lachelle Duarte Modify [M54.16] Lumbar radiculopathy     11/1/2024 12:11 PM Lachelle Duarte Modify [M51.27] Lumbosacral disc herniation                  Lachelle Duarte MD  11/01/24 2032

## 2024-11-02 NOTE — ASSESSMENT & PLAN NOTE
Low back pain radiating into left anterior thigh  Presented yesterday with worsening back pain radiating into left anterior thigh.  Patient states this started Monday morning, and has progressively worsened since then.  He states Wednesday he had to start using a cane due to pain and balance.  He saw his PCP on 10/31 who gave him a steroid injection as well as p.o. steroids with no relief.  He was then prescribed tramadol on Thursday.    Imaging:    MRI lumbar spine wo 11/2/24:Mild motion artifact is present.L5-S1: Left subarticular disc extrusion compresses left S1 descending nerve root resulting in mild canal stenosis. Consider consultation with spine surgery.Multilevel degenerative changes of lumbar spine with varying degrees of canal stenosis (mild L5-S1) and foraminal narrowing (severe left L5-S1; moderate right L5-S1), as detailed above.    Plan:  Continue frequent neurological checks.   Reviewed imaging extensively with patient and daughter at bedside, his symptoms do not correlate with MRI findings.  No neurosurgical intervention warranted.  Recommend conservative management with multimodal pain regimen.  Can consider APS consult and Medrol Dosepak.  Recommend checking PVRs x3  Recommend outpatient referral to PT and pain management for possible SHEY  Medical management and pain control per primary team  DVT PPX: SCDs and Lovenox    Neurosurgery will follow from the periphery and if patient still here on Monday we will reevaluate, call with any further questions or concerns.

## 2024-11-02 NOTE — ASSESSMENT & PLAN NOTE
Lab Results   Component Value Date    HGBA1C 7.3 (H) 11/01/2024       Recent Labs     11/01/24  1300 11/01/24  1736 11/02/24  0537 11/02/24  1106   POCGLU 154* 158* 154* 127       Blood Sugar Average: Last 72 hrs:  (P) 148.25

## 2024-11-03 LAB
GLUCOSE SERPL-MCNC: 127 MG/DL (ref 65–140)
GLUCOSE SERPL-MCNC: 146 MG/DL (ref 65–140)
GLUCOSE SERPL-MCNC: 171 MG/DL (ref 65–140)
GLUCOSE SERPL-MCNC: 190 MG/DL (ref 65–140)

## 2024-11-03 PROCEDURE — 97167 OT EVAL HIGH COMPLEX 60 MIN: CPT

## 2024-11-03 PROCEDURE — 82948 REAGENT STRIP/BLOOD GLUCOSE: CPT

## 2024-11-03 PROCEDURE — 97163 PT EVAL HIGH COMPLEX 45 MIN: CPT

## 2024-11-03 PROCEDURE — 99232 SBSQ HOSP IP/OBS MODERATE 35: CPT | Performed by: FAMILY MEDICINE

## 2024-11-03 RX ORDER — SENNOSIDES 8.6 MG
2 TABLET ORAL
Status: DISCONTINUED | OUTPATIENT
Start: 2024-11-03 | End: 2024-11-04 | Stop reason: HOSPADM

## 2024-11-03 RX ORDER — LIDOCAINE 50 MG/G
1 PATCH TOPICAL DAILY
Status: DISCONTINUED | OUTPATIENT
Start: 2024-11-04 | End: 2024-11-04 | Stop reason: HOSPADM

## 2024-11-03 RX ORDER — METHOCARBAMOL 500 MG/1
1000 TABLET, FILM COATED ORAL EVERY 8 HOURS SCHEDULED
Status: DISCONTINUED | OUTPATIENT
Start: 2024-11-03 | End: 2024-11-04 | Stop reason: HOSPADM

## 2024-11-03 RX ORDER — POLYETHYLENE GLYCOL 3350 17 G/17G
17 POWDER, FOR SOLUTION ORAL DAILY
Status: DISCONTINUED | OUTPATIENT
Start: 2024-11-04 | End: 2024-11-04 | Stop reason: HOSPADM

## 2024-11-03 RX ADMIN — ENOXAPARIN SODIUM 40 MG: 40 INJECTION SUBCUTANEOUS at 08:36

## 2024-11-03 RX ADMIN — HYDROMORPHONE HYDROCHLORIDE 4 MG: 4 TABLET ORAL at 14:45

## 2024-11-03 RX ADMIN — METHOCARBAMOL 1000 MG: 500 TABLET ORAL at 21:24

## 2024-11-03 RX ADMIN — LIDOCAINE 1 PATCH: 50 PATCH TOPICAL at 08:36

## 2024-11-03 RX ADMIN — ACETAMINOPHEN 975 MG: 325 TABLET, FILM COATED ORAL at 05:15

## 2024-11-03 RX ADMIN — HYDROMORPHONE HYDROCHLORIDE 1 MG: 1 INJECTION, SOLUTION INTRAMUSCULAR; INTRAVENOUS; SUBCUTANEOUS at 21:25

## 2024-11-03 RX ADMIN — ACETAMINOPHEN 975 MG: 325 TABLET, FILM COATED ORAL at 21:24

## 2024-11-03 RX ADMIN — INSULIN GLARGINE 15 UNITS: 100 INJECTION, SOLUTION SUBCUTANEOUS at 21:25

## 2024-11-03 RX ADMIN — INSULIN LISPRO 1 UNITS: 100 INJECTION, SOLUTION INTRAVENOUS; SUBCUTANEOUS at 16:55

## 2024-11-03 RX ADMIN — HYDROMORPHONE HYDROCHLORIDE 4 MG: 4 TABLET ORAL at 05:15

## 2024-11-03 RX ADMIN — METHOCARBAMOL 750 MG: 750 TABLET ORAL at 05:15

## 2024-11-03 RX ADMIN — ASPIRIN 81 MG: 81 TABLET, COATED ORAL at 08:36

## 2024-11-03 RX ADMIN — HYDROMORPHONE HYDROCHLORIDE 1 MG: 1 INJECTION, SOLUTION INTRAMUSCULAR; INTRAVENOUS; SUBCUTANEOUS at 10:46

## 2024-11-03 RX ADMIN — GABAPENTIN 100 MG: 100 CAPSULE ORAL at 16:57

## 2024-11-03 RX ADMIN — METHYLPREDNISOLONE 16 MG: 4 TABLET ORAL at 08:36

## 2024-11-03 RX ADMIN — INSULIN LISPRO 1 UNITS: 100 INJECTION, SOLUTION INTRAVENOUS; SUBCUTANEOUS at 21:26

## 2024-11-03 RX ADMIN — ACETAMINOPHEN 975 MG: 325 TABLET, FILM COATED ORAL at 13:14

## 2024-11-03 RX ADMIN — METHOCARBAMOL 750 MG: 750 TABLET ORAL at 13:14

## 2024-11-03 RX ADMIN — HYDROMORPHONE HYDROCHLORIDE 4 MG: 4 TABLET ORAL at 19:45

## 2024-11-03 RX ADMIN — ATORVASTATIN CALCIUM 10 MG: 10 TABLET, FILM COATED ORAL at 16:57

## 2024-11-03 RX ADMIN — GABAPENTIN 100 MG: 100 CAPSULE ORAL at 08:36

## 2024-11-03 RX ADMIN — CHOLECALCIFEROL TAB 10 MCG (400 UNIT) 400 UNITS: 10 TAB at 08:37

## 2024-11-03 NOTE — PLAN OF CARE
Problem: Potential for Falls  Goal: Patient will remain free of falls  Description: INTERVENTIONS:  - Educate patient/family on patient safety including physical limitations  - Instruct patient to call for assistance with activity   - Consult OT/PT to assist with strengthening/mobility   - Keep Call bell within reach  - Keep bed low and locked with side rails adjusted as appropriate  - Keep care items and personal belongings within reach  - Initiate and maintain comfort rounds  - Make Fall Risk Sign visible to staff  - Offer Toileting every 2 Hours, in advance of need  - Initiate/Maintain bed alarm  - Obtain necessary fall risk management equipment:   - Apply yellow socks and bracelet for high fall risk patients  - Consider moving patient to room near nurses station  Outcome: Progressing     Problem: Prexisting or High Potential for Compromised Skin Integrity  Goal: Skin integrity is maintained or improved  Description: INTERVENTIONS:  - Identify patients at risk for skin breakdown  - Assess and monitor skin integrity  - Assess and monitor nutrition and hydration status  - Monitor labs   - Assess for incontinence   - Turn and reposition patient  - Assist with mobility/ambulation  - Relieve pressure over bony prominences  - Avoid friction and shearing  - Provide appropriate hygiene as needed including keeping skin clean and dry  - Evaluate need for skin moisturizer/barrier cream  - Collaborate with interdisciplinary team   - Patient/family teaching  - Consider wound care consult   Outcome: Progressing     Problem: PAIN - ADULT  Goal: Verbalizes/displays adequate comfort level or baseline comfort level  Description: Interventions:  - Encourage patient to monitor pain and request assistance  - Assess pain using appropriate pain scale  - Administer analgesics based on type and severity of pain and evaluate response  - Implement non-pharmacological measures as appropriate and evaluate response  - Consider cultural and  social influences on pain and pain management  - Notify physician/advanced practitioner if interventions unsuccessful or patient reports new pain  Outcome: Progressing     Problem: INFECTION - ADULT  Goal: Absence or prevention of progression during hospitalization  Description: INTERVENTIONS:  - Assess and monitor for signs and symptoms of infection  - Monitor lab/diagnostic results  - Monitor all insertion sites, i.e. indwelling lines, tubes, and drains  - Monitor endotracheal if appropriate and nasal secretions for changes in amount and color  - Munger appropriate cooling/warming therapies per order  - Administer medications as ordered  - Instruct and encourage patient and family to use good hand hygiene technique  - Identify and instruct in appropriate isolation precautions for identified infection/condition  Outcome: Progressing  Goal: Absence of fever/infection during neutropenic period  Description: INTERVENTIONS:  - Monitor WBC    Outcome: Progressing     Problem: SAFETY ADULT  Goal: Patient will remain free of falls  Description: INTERVENTIONS:  - Educate patient/family on patient safety including physical limitations  - Instruct patient to call for assistance with activity   - Consult OT/PT to assist with strengthening/mobility   - Keep Call bell within reach  - Keep bed low and locked with side rails adjusted as appropriate  - Keep care items and personal belongings within reach  - Initiate and maintain comfort rounds  - Make Fall Risk Sign visible to staff  - Offer Toileting every 2 Hours, in advance of need  - Initiate/Maintain bedalarm  - Obtain necessary fall risk management equipment:   - Apply yellow socks and bracelet for high fall risk patients  - Consider moving patient to room near nurses station  Outcome: Progressing  Goal: Maintain or return to baseline ADL function  Description: INTERVENTIONS:  - Educate patient/family on patient safety including physical limitations  - Instruct patient to  call for assistance with activity   - Consult OT/PT to assist with strengthening/mobility   - Keep Call bell within reach  - Keep bed low and locked with side rails adjusted as appropriate  - Keep care items and personal belongings within reach  - Initiate and maintain comfort rounds  - Make Fall Risk Sign visible to staff  - Offer Toileting every 2 Hours, in advance of need  - Initiate/Maintain bed alarm  - Obtain necessary fall risk management equipment:   - Apply yellow socks and bracelet for high fall risk patients  - Consider moving patient to room near nurses station  Outcome: Progressing  Goal: Maintains/Returns to pre admission functional level  Description: INTERVENTIONS:  -  Assess patient's ability to carry out ADLs; assess patient's baseline for ADL function and identify physical deficits which impact ability to perform ADLs (bathing, care of mouth/teeth, toileting, grooming, dressing, etc.)  - Assess/evaluate cause of self-care deficits   - Assess range of motion  - Assess patient's mobility; develop plan if impaired  - Assess patient's need for assistive devices and provide as appropriate  - Encourage maximum independence but intervene and supervise when necessary  - Involve family in performance of ADLs  - Assess for home care needs following discharge   - Consider OT consult to assist with ADL evaluation and planning for discharge  - Provide patient education as appropriate  Outcome: Progressing     Problem: DISCHARGE PLANNING  Goal: Discharge to home or other facility with appropriate resources  Description: INTERVENTIONS:  - Identify barriers to discharge w/patient and caregiver  - Arrange for needed discharge resources and transportation as appropriate  - Identify discharge learning needs (meds, wound care, etc.)  - Arrange for interpretive services to assist at discharge as needed  - Refer to Case Management Department for coordinating discharge planning if the patient needs post-hospital  services based on physician/advanced practitioner order or complex needs related to functional status, cognitive ability, or social support system  Outcome: Progressing     Problem: Knowledge Deficit  Goal: Patient/family/caregiver demonstrates understanding of disease process, treatment plan, medications, and discharge instructions  Description: Complete learning assessment and assess knowledge base.  Interventions:  - Provide teaching at level of understanding  - Provide teaching via preferred learning methods  Outcome: Progressing

## 2024-11-03 NOTE — ASSESSMENT & PLAN NOTE
Patient presented with severe back pain and ambulatory dysfunction  No falls or trauma  Pain radiating to bilateral thigh -left more than right  CT scan with Moderate L5-S1 left paracentral disc protrusion abutting and posteriorly displacing the descending left S1 nerve root.  Admit patient to the hospital  Check lumbar spine MRI-MRI reviewed  Consult neurosurgery-neurosurgery consultation appreciated.  No surgical intervention as per neurosurgery.  PT OT eval  Pain control with muscle relaxants/reported that oxycodone does not help with the pain.  Will change it in to p.o. Dilaudid.  APS consult   Start Medrol Dosepak  Pain better controlled on current regimen appears to be using less of the PRNs today.  Complaining of pain behind the  left knee which is bothersome for him.  Add bowel regimen

## 2024-11-03 NOTE — ASSESSMENT & PLAN NOTE
Lab Results   Component Value Date    HGBA1C 7.3 (H) 11/01/2024       Recent Labs     11/02/24  2036 11/03/24  0610 11/03/24  1040 11/03/24  1601   POCGLU 165* 127 146* 190*       Blood Sugar Average: Last 72 hrs:  (P) [id number]  Hold oral metformin  And Lantus at bedtime  Start insulin sliding scale  Monitor

## 2024-11-03 NOTE — PLAN OF CARE
Problem: OCCUPATIONAL THERAPY ADULT  Goal: Performs self-care activities at highest level of function for planned discharge setting.  See evaluation for individualized goals.  Description: Treatment Interventions: ADL retraining, Functional transfer training, UE strengthening/ROM, Endurance training, Patient/family training, Cognitive reorientation, Equipment evaluation/education, Fine motor coordination activities, Compensatory technique education, Continued evaluation, Energy conservation, Activityengagement  Equipment Recommended: Bedside commode, Shower/Tub chair with back ($), Reacher ($), Sock aid ($)       See flowsheet documentation for full assessment, interventions and recommendations.   Note: Limitation: Decreased ADL status, Decreased Safe judgement during ADL, Decreased endurance, Decreased high-level ADLs, Decreased self-care trans  Prognosis: Fair  Assessment: 49 year old pt seen today for an OT evaluation following admission to [facility] 2/2 back pain, lumbar radiculopathy with present symptoms significant for pain, fatigue, weakness, decreased ADL status, decreased functional mobility. Pt  has a past medical history of Cancer (HCC), Cardiac disease, and Ear problems. Pt reported living with spouse in a 2SH, 1STE with bathroom downstairs and ability to stay on recliner if needed. Pt reports being IND with all ADLs/IADLs PTA but has recently had trouble getting on/off toilet, completing LB ADLs 2/2 pain this past week. No DME used. +. Pt works fixing houses FT.  Pt very pleasant and cooperative t/o session. Pt completed functional bed mobility with mod Ax2. Min A for functional STS txfs and functional mobility with RW. Pt was mod A for UB ADLs and max A for LB ADLs 2/2 low back pain. The patient's raw score on the AM-PAC Daily Activity Inpatient Short Form is 14. A raw score of less than 19 suggests the patient may benefit from discharge to post-acute rehabilitation  services. Please refer to the recommendation of the Occupational Therapist for safe discharge planning. Pt is functioning below baseline level of function and will continue to benefit from skilled acute OT to promote increased independence and return to PLOF. At this time, current OT recommendation is Level 1 resources. Will continue to follow and assess.     Rehab Resource Intensity Level, OT: I (Maximum Resource Intensity)

## 2024-11-03 NOTE — PLAN OF CARE
Problem: PHYSICAL THERAPY ADULT  Goal: Performs mobility at highest level of function for planned discharge setting.  See evaluation for individualized goals.  Description: Treatment/Interventions: Functional transfer training, LE strengthening/ROM, Elevations, Therapeutic exercise, Endurance training, Patient/family training, Equipment eval/education, Bed mobility, Gait training, Compensatory technique education, Spoke to nursing, OT, Family  Equipment Recommended: Walker       See flowsheet documentation for full assessment, interventions and recommendations.  Note: Prognosis: Excellent  Problem List: Decreased strength, Decreased range of motion, Decreased endurance, Impaired balance, Decreased mobility, Pain  Assessment: Pt is 49 y.o. male seen for a PT evaluation s/p admit to [facility] on 11/1/2024. Pt presenting w/ lumbar radiculopathy. Please see above for other active problem list / PMH. PT now consulted to assess functional mobility and needs for safe d/c planning. Prior to admission, pt was I + active w/o AD, lives w/ spouse in a 2SH w/ 1 ANDREA. Currently pt requires ModAx2-MinAx1 for bed skills; MinAx1 for functional transfers; MinAx1 for 2' ambulation w/ RW. Pt presents functioning below baseline and w/ overall mobility deficits 2* to: decreased LE strength; decreased endurance; impaired balance; gait deviations; pain; fatigue. These impairments place pt at risk for falls. Pt will continue to benefit from skilled PT interventions to address stated impairments; to maximize functional potential; for ongoing pt/ family training; and DME needs. PT is currently recommending acute medical rehab for now; likely will be able to progress to OPPT if improved pain control can be achieved while in-house.  Barriers to Discharge: Inaccessible home environment, Decreased caregiver support     Rehab Resource Intensity Level, PT: I (Maximum Resource Intensity) (vs Min resources w/ improved pain control)    See  flowsheet documentation for full assessment.

## 2024-11-03 NOTE — PLAN OF CARE
Problem: Potential for Falls  Goal: Patient will remain free of falls  Description: INTERVENTIONS:  - Educate patient/family on patient safety including physical limitations  - Instruct patient to call for assistance with activity   - Consult OT/PT to assist with strengthening/mobility   - Keep Call bell within reach  - Keep bed low and locked with side rails adjusted as appropriate  - Keep care items and personal belongings within reach  - Initiate and maintain comfort rounds  - Make Fall Risk Sign visible to staff  - Offer Toileting every 2 Hours, in advance of need  - Obtain necessary fall risk management equipment  - Apply yellow socks and bracelet for high fall risk patients  - Consider moving patient to room near nurses station  Outcome: Progressing     Problem: Prexisting or High Potential for Compromised Skin Integrity  Goal: Skin integrity is maintained or improved  Description: INTERVENTIONS:  - Identify patients at risk for skin breakdown  - Assess and monitor skin integrity  - Assess and monitor nutrition and hydration status  - Monitor labs   - Assess for incontinence   - Turn and reposition patient  - Assist with mobility/ambulation  - Relieve pressure over bony prominences  - Avoid friction and shearing  - Provide appropriate hygiene as needed including keeping skin clean and dry  - Evaluate need for skin moisturizer/barrier cream  - Collaborate with interdisciplinary team   - Patient/family teaching  - Consider wound care consult   Outcome: Progressing     Problem: PAIN - ADULT  Goal: Verbalizes/displays adequate comfort level or baseline comfort level  Description: Interventions:  - Encourage patient to monitor pain and request assistance  - Assess pain using appropriate pain scale  - Administer analgesics based on type and severity of pain and evaluate response  - Implement non-pharmacological measures as appropriate and evaluate response  - Consider cultural and social influences on pain and  pain management  - Notify physician/advanced practitioner if interventions unsuccessful or patient reports new pain  Outcome: Progressing     Problem: INFECTION - ADULT  Goal: Absence or prevention of progression during hospitalization  Description: INTERVENTIONS:  - Assess and monitor for signs and symptoms of infection  - Monitor lab/diagnostic results  - Monitor all insertion sites, i.e. indwelling lines, tubes, and drains  - Monitor endotracheal if appropriate and nasal secretions for changes in amount and color  - Mobile appropriate cooling/warming therapies per order  - Administer medications as ordered  - Instruct and encourage patient and family to use good hand hygiene technique  - Identify and instruct in appropriate isolation precautions for identified infection/condition  Outcome: Progressing  Goal: Absence of fever/infection during neutropenic period  Description: INTERVENTIONS:  - Monitor WBC    Outcome: Progressing     Problem: SAFETY ADULT  Goal: Patient will remain free of falls  Description: INTERVENTIONS:  - Educate patient/family on patient safety including physical limitations  - Instruct patient to call for assistance with activity   - Consult OT/PT to assist with strengthening/mobility   - Keep Call bell within reach  - Keep bed low and locked with side rails adjusted as appropriate  - Keep care items and personal belongings within reach  - Initiate and maintain comfort rounds  - Make Fall Risk Sign visible to staff  - Offer Toileting every 2 Hours, in advance of need  - Obtain necessary fall risk management equipment  - Apply yellow socks and bracelet for high fall risk patients  - Consider moving patient to room near nurses station  Outcome: Progressing  Goal: Maintain or return to baseline ADL function  Description: INTERVENTIONS:  -  Assess patient's ability to carry out ADLs; assess patient's baseline for ADL function and identify physical deficits which impact ability to perform ADLs  (bathing, care of mouth/teeth, toileting, grooming, dressing, etc.)  - Assess/evaluate cause of self-care deficits   - Assess range of motion  - Assess patient's mobility; develop plan if impaired  - Assess patient's need for assistive devices and provide as appropriate  - Encourage maximum independence but intervene and supervise when necessary  - Involve family in performance of ADLs  - Assess for home care needs following discharge   - Consider OT consult to assist with ADL evaluation and planning for discharge  - Provide patient education as appropriate  Outcome: Progressing  Goal: Maintains/Returns to pre admission functional level  Description: INTERVENTIONS:  - Perform AM-PAC 6 Click Basic Mobility/ Daily Activity assessment daily.  - Set and communicate daily mobility goal to care team and patient/family/caregiver.   - Collaborate with rehabilitation services on mobility goals if consulted  - Perform Range of Motion 3 times a day.  - Reposition patient every 2 hours.  - Dangle patient 3 times a day  - Stand patient 3 times a day  - Ambulate patient 3 times a day  - Out of bed to chair 3 times a day   - Out of bed for meals 3 times a day  - Out of bed for toileting  - Record patient progress and toleration of activity level   Outcome: Progressing     Problem: DISCHARGE PLANNING  Goal: Discharge to home or other facility with appropriate resources  Description: INTERVENTIONS:  - Identify barriers to discharge w/patient and caregiver  - Arrange for needed discharge resources and transportation as appropriate  - Identify discharge learning needs (meds, wound care, etc.)  - Arrange for interpretive services to assist at discharge as needed  - Refer to Case Management Department for coordinating discharge planning if the patient needs post-hospital services based on physician/advanced practitioner order or complex needs related to functional status, cognitive ability, or social support system  Outcome:  Progressing     Problem: Knowledge Deficit  Goal: Patient/family/caregiver demonstrates understanding of disease process, treatment plan, medications, and discharge instructions  Description: Complete learning assessment and assess knowledge base.  Interventions:  - Provide teaching at level of understanding  - Provide teaching via preferred learning methods  Outcome: Progressing

## 2024-11-03 NOTE — PROGRESS NOTES
Progress Note - Hospitalist   Name: Ihsan Gomez 49 y.o. male I MRN: [id number]  Unit/Bed#: [facility] Pro-01 I Date of Admission: 11/1/2024   Date of Service: 11/3/2024 I Hospital Day: 2     Assessment & Plan  Lumbar radiculopathy  Patient presented with severe back pain and ambulatory dysfunction  No falls or trauma  Pain radiating to bilateral thigh -left more than right  CT scan with Moderate L5-S1 left paracentral disc protrusion abutting and posteriorly displacing the descending left S1 nerve root.  Admit patient to the hospital  Check lumbar spine MRI-MRI reviewed  Consult neurosurgery-neurosurgery consultation appreciated.  No surgical intervention as per neurosurgery.  PT OT eval  Pain control with muscle relaxants/reported that oxycodone does not help with the pain.  Will change it in to p.o. Dilaudid.  APS consult   Start Medrol Dosepak  Pain better controlled on current regimen appears to be using less of the PRNs today.  Complaining of pain behind the  left knee which is bothersome for him.  Add bowel regimen  Diabetes mellitus type 2, controlled (HCC)  Lab Results   Component Value Date    HGBA1C 7.3 (H) 11/01/2024       Recent Labs     11/02/24  2036 11/03/24  0610 11/03/24  1040 11/03/24  1601   POCGLU 165* 127 146* 190*       Blood Sugar Average: Last 72 hrs:  (P) [id number]  Hold oral metformin  And Lantus at bedtime  Start insulin sliding scale  Monitor    Dyslipidemia  Continue with statin  Low back pain      VTE Pharmacologic Prophylaxis: VTE Score: 4 Moderate Risk (Score 3-4) - Pharmacological DVT Prophylaxis Ordered: enoxaparin (Lovenox).    Mobility:   Basic Mobility Inpatient Raw Score: 11  JH-HLM Goal: 4: Move to chair/commode  JH-HLM Achieved: 5: Stand (1 or more minutes)  JH-HLM Goal achieved. Continue to encourage appropriate mobility.    Patient Centered Rounds: I performed bedside rounds with nursing staff today.   Discussions with Specialists or Other Care Team Provider:      Education and Discussions with Family / Patient:  updated patient .     Current Length of Stay: 2 day(s)  Current Patient Status: Inpatient   Certification Statement: The patient will continue to require additional inpatient hospital stay due to pain control yesterday  Discharge Plan:   Code Status: Level 1 - Full Code    Subjective   Patient seen and examined.  Patient reported that he feels like the pain is slightly better.  Has not been using much of the PRNs today.  Unfortunately patient still with decreased mobility secondary to the pain.  PT evaluation appreciated.  APS consultation pending  Patient reported that he has new onset of shooting pain behind his left knee which is very bothersome for him.  No bowel movement yet.  Discussed with the patient about the importance of bowel regimen.  Patient is worried about the thought of getting up and going to the bathroom    Objective :  Temp:  [97.9 °F (36.6 °C)-98.1 °F (36.7 °C)] 98.1 °F (36.7 °C)  HR:  [71-73] 73  BP: (121-128)/(70-73) 121/73  Resp:  [16-17] 16  SpO2:  [92 %-95 %] 93 %  O2 Device: None (Room air)    Body mass index is 33.7 kg/m².     Input and Output Summary (last 24 hours):     Intake/Output Summary (Last 24 hours) at 11/3/2024 1624  Last data filed at 11/3/2024 1603  Gross per 24 hour   Intake 300 ml   Output 1700 ml   Net -1400 ml       Physical Exam  Constitutional:       General: He is not in acute distress.     Appearance: He is not ill-appearing.   HENT:      Head: Normocephalic and atraumatic.      Right Ear: There is no impacted cerumen.      Nose: Nose normal.   Eyes:      General: No scleral icterus.  Cardiovascular:      Rate and Rhythm: Normal rate and regular rhythm.      Heart sounds: No murmur heard.  Pulmonary:      Effort: Pulmonary effort is normal. No respiratory distress.   Abdominal:      General: There is no distension.      Tenderness: There is no abdominal tenderness.   Musculoskeletal:         General: No swelling.    Skin:     Coloration: Skin is not jaundiced.   Neurological:      Mental Status: He is alert. Mental status is at baseline.           Lines/Drains:              Lab Results: I have reviewed the following results:   Results from last 7 days   Lab Units 11/01/24  1315 11/01/24  0912   WBC Thousand/uL  --  7.18   HEMOGLOBIN g/dL  --  13.6   HEMATOCRIT %  --  39.8   PLATELETS Thousands/uL 193 186   SEGS PCT %  --  67   LYMPHO PCT %  --  24   MONO PCT %  --  7   EOS PCT %  --  1     Results from last 7 days   Lab Units 11/01/24  0912   SODIUM mmol/L 135   POTASSIUM mmol/L 4.1   CHLORIDE mmol/L 103   CO2 mmol/L 25   BUN mg/dL 20   CREATININE mg/dL 0.66   ANION GAP mmol/L 7   CALCIUM mg/dL 8.9   ALBUMIN g/dL 4.3   TOTAL BILIRUBIN mg/dL 0.45   ALK PHOS U/L 68   ALT U/L 19   AST U/L 13   GLUCOSE RANDOM mg/dL 215*         Results from last 7 days   Lab Units 11/03/24  1601 11/03/24  1040 11/03/24  0610 11/02/24  2036 11/02/24  1536 11/02/24  1106 11/02/24  0537 11/01/24  1736 11/01/24  1300   POC GLUCOSE mg/dl 190* 146* 127 165* 178* 127 154* 158* 154*     Results from last 7 days   Lab Units 11/01/24  1315   HEMOGLOBIN A1C % 7.3*           Recent Cultures (last 7 days):               Last 24 Hours Medication List:     Current Facility-Administered Medications:     acetaminophen (TYLENOL) tablet 975 mg, Q8H CURTIS    aspirin (ECOTRIN LOW STRENGTH) EC tablet 81 mg, Daily    atorvastatin (LIPITOR) tablet 10 mg, Daily With Dinner    Cholecalciferol (VITAMIN D3) tablet 400 Units, Daily    enoxaparin (LOVENOX) subcutaneous injection 40 mg, Daily    gabapentin (NEURONTIN) capsule 100 mg, BID    HYDROmorphone (DILAUDID) injection 1 mg, Q4H PRN    HYDROmorphone (DILAUDID) tablet 2 mg, Q4H PRN    HYDROmorphone (DILAUDID) tablet 4 mg, Q4H PRN    insulin glargine (LANTUS) subcutaneous injection 15 Units 0.15 mL, HS    insulin lispro (HumALOG/ADMELOG) 100 units/mL subcutaneous injection 1-5 Units, 4x Daily (PC & HS) **AND** Fingerstick  Glucose (POCT), 4x Daily AC and at bedtime    lidocaine (LIDODERM) 5 % patch 1 patch, Daily    [START ON 11/4/2024] lidocaine (LIDODERM) 5 % patch 1 patch, Daily    methocarbamol (ROBAXIN) tablet 1,000 mg, Q8H CURTIS    [COMPLETED] methylprednisolone (MEDROL) tablet 24 mg, Daily **FOLLOWED BY** [COMPLETED] methylprednisolone (MEDROL) tablet 20 mg, Daily **FOLLOWED BY** [COMPLETED] methylprednisolone (MEDROL) tablet 16 mg, Daily **FOLLOWED BY** [START ON 11/4/2024] methylprednisolone (MEDROL) tablet 12 mg, Daily **FOLLOWED BY** [START ON 11/5/2024] methylprednisolone (MEDROL) tablet 8 mg, Daily **FOLLOWED BY** [START ON 11/6/2024] methylprednisolone (MEDROL) tablet 4 mg, Daily    ondansetron (ZOFRAN) injection 4 mg, Q6H PRN    [START ON 11/4/2024] polyethylene glycol (MIRALAX) packet 17 g, Daily    senna (SENOKOT) tablet 17.2 mg, HS    Administrative Statements   Today, Patient Was Seen By: Prerna Duckworth MD      **Please Note: This note may have been constructed using a voice recognition system.**

## 2024-11-03 NOTE — CASE MANAGEMENT
Case Management Assessment & Discharge Planning Note    Patient name Ihsan Gomez  Location /-01 MRN [id number]  : 1975 Date 11/3/2024       Current Admission Date: 2024  Current Admission Diagnosis:Lumbar radiculopathy   Patient Active Problem List    Diagnosis Date Noted Date Diagnosed    Low back pain 2024     Lumbar radiculopathy 2024     Diabetes mellitus type 2, controlled (HCC) 2024     Dyslipidemia 2024       LOS (days): 2  Geometric Mean LOS (GMLOS) (days): 2.9  Days to GMLOS:0.7     OBJECTIVE:    Risk of Unplanned Readmission Score: 7.11         Current admission status: Inpatient       Preferred Pharmacy:   [id number] - [place], [place] - [address] [address]  Phone: [phone] Fax: [phone]    Primary Care Provider: Ankit Galeano MD    Primary Insurance: [facility]  Secondary Insurance:     ASSESSMENT:  Active Health Care Proxies       Pat[facility]ri [facility] Care Representative - Spouse   Primary Phone: [phone] (Home)                 Patient Information  Admitted from:: Home  Mental Status: Alert  During Assessment patient was accompanied by: Not accompanied during assessment  Assessment information provided by:: Patient  Primary Caregiver: Self  Support Systems: Self, Spouse/significant other  County of Residence: [place]  What city do you live in?: [place]  Home entry access options. Select all that apply.: Stairs  Number of steps to enter home.: 1  Type of Current Residence: 2 story home  Upon entering residence, is there a bedroom on the main floor (no further steps)?: No  A bedroom is located on the following floor levels of residence (select all that apply):: 2nd Floor  Upon entering residence, is there a bathroom on the main floor (no further steps)?: Yes  Living Arrangements: Lives w/ Spouse/significant other  Is patient a ?: No    Activities of Daily Living Prior to  Admission  Functional Status: Independent  Completes ADLs independently?: Yes  Ambulates independently?: Yes  Does patient use assisted devices?: No  Does patient currently own DME?: Yes  What DME does the patient currently own?: Bedside Commode, Crutches, Straight Cane, Walker, Wheelchair  Does patient have a history of Outpatient Therapy (PT/OT)?: Yes (OP PT and cardiac therapy)  Does the patient have a history of Short-Term Rehab?: No  Does patient have a history of HHC?: No  Does patient currently have HHC?: No    Patient Information Continued  Income Source: Employed  Does patient have prescription coverage?: Yes  Does patient receive dialysis treatments?: No  Does patient have a history of substance abuse?: No  Does patient have a history of Mental Health Diagnosis?: No    Means of Transportation  Means of Transport to Appts:: Drives Self          DISCHARGE DETAILS:    Discharge planning discussed with:: Patient  Freedom of Choice: Yes  Comments - Freedom of Choice: Discussed FOC  CM contacted family/caregiver?: No- see comments (declined)     This CM introduced self and role to patient.  Lives with spouse in 2 story home, [facility], has full bath on 1st floor, full bath and bedroom located on 2nd floor.  IADLS, employed, drives, has multiple pieces of DME at home (does not use anything at this time)

## 2024-11-03 NOTE — OCCUPATIONAL THERAPY NOTE
Occupational Therapy Evaluation     Patient Name: Ihsan Gomez  Today's Date: 11/3/2024  Problem List  Principal Problem:    Lumbar radiculopathy  Active Problems:    Diabetes mellitus type 2, controlled (HCC)    Dyslipidemia    Low back pain    Past Medical History  Past Medical History:   Diagnosis Date    Cancer (HCC)     Cardiac disease     Ear problems      Past Surgical History  Past Surgical History:   Procedure Laterality Date    AORTIC ARCH REPAIR      CARDIAC SURGERY      ROTATOR CUFF REPAIR Left              11/03/24 1135   OT Last Visit   OT Visit Date 11/03/24   Note Type   Note type Evaluation   Pain Assessment   Pain Assessment Tool 0-10   Pain Score 10 - Worst Possible Pain   Pain Location/Orientation Location: Back   Pain Onset/Description Onset: Ongoing   Effect of Pain on Daily Activities increased pain with functional mobility  (RN updated)   Patient's Stated Pain Goal No pain   Hospital Pain Intervention(s) Repositioned;Ambulation/increased activity;Emotional support   Restrictions/Precautions   Weight Bearing Precautions Per Order No  (cleared by neurosx, per note, conservative management.)   Braces or Orthoses   (none)   Other Precautions Multiple lines;Fall Risk;Pain   Home Living   Type of Home House   Home Layout Two level;1/2 bath on main level;Bed/bath upstairs;Stairs to enter with rails  (1STE, bathroom on main level and able to sleep in recliner if needed)   Bathroom Shower/Tub Walk-in shower   Bathroom Toilet Raised   Bathroom Equipment Grab bars in shower;Grab bars around toilet;Shower chair   Home Equipment Other (Comment)  (Pt not using DME PTA, but reports that he has access to DME if needed from his parents)   Additional Comments 2SH, 1STE, bathroom on main floor and able to stay on main floor if sleeps in the recliner. Otherwise bedroom upstairs. No DME used.   Prior Function   Level of [place] Independent with ADLs;Independent with functional mobility;Independent with  "IADLS   Lives With Spouse   Receives Help From Family   IADLs Independent with driving;Independent with meal prep;Independent with medication management   Falls in the last 6 months 0   Vocational Full time employment   Comments Spouse works, and leaves early in the morning for work.   Lifestyle   Autonomy IND PTA with all ADLs/IADLs. No DME used. +.   Reciprocal Relationships Lives with supportive spouse.   Service to Others Works FT building [employer]   Intrinsic Gratification Enjoys spending time with family   General   Family/Caregiver Present Yes   Subjective   Subjective \"I'm worried that the pain is psychological\"   ADL   Where Assessed Edge of bed   Eating Assistance 5  Supervision/Setup   Grooming Assistance 5  Supervision/Setup   UB Bathing Assistance 3  Moderate Assistance   LB Bathing Assistance 2  Maximal Assistance   UB Dressing Assistance 3  Moderate Assistance   LB Dressing Assistance 2  Maximal Assistance   Toileting Assistance  2  Maximal Assistance   Bed Mobility   Rolling L 5  Supervision   Additional items Bedrails;Increased time required;Verbal cues  (VCs for log roll technique)   Supine to Sit 3  Moderate assistance   Additional items Assist x 2;HOB elevated;Bedrails;Increased time required;Verbal cues;LE management   Sit to Supine 3  Moderate assistance   Additional items Assist x 1;Bedrails;HOB elevated;Increased time required;Verbal cues;LE management   Additional Comments Pt in bed upon arrival. Able to roll onto L side with SUP, but required mod Ax2 to sit up 2/2 pain. Pt able to sit EOB with min A but reports increasing pain. After standing and steps, pt returned to bed with all needs met, call bell within reach.   Transfers   Sit to Stand 4  Minimal assistance   Additional items Assist x 1;Increased time required;Verbal cues   Stand to Sit 4  Minimal assistance   Additional items Assist x 1;Increased time required;Verbal cues   Additional Comments RW in stance. Limited weight through " LEs 2/2 pain, fatigue.   Functional Mobility   Functional Mobility 4  Minimal assistance   Additional Comments Ax1, small steps up in bed. RW   Additional items Rolling walker   Balance   Static Sitting Poor +   Dynamic Sitting Poor   Static Standing Poor   Dynamic Standing Poor   Ambulatory Poor   Activity Tolerance   Activity Tolerance Patient limited by fatigue;Patient limited by pain   Medical Staff Made Aware Co-anil w PT 2/2 increased medical complexity and comorbidities.   Nurse Made Aware RN cleared for therapy   RUE Assessment   RUE Assessment WFL   LUE Assessment   LUE Assessment WFL   Hand Function   Gross Motor Coordination Functional   Fine Motor Coordination Functional   Cognition   Overall Cognitive Status WFL   Arousal/Participation Cooperative;Alert   Attention Within functional limits   Orientation Level Oriented X4   Memory Within functional limits   Following Commands Follows all commands and directions without difficulty   Comments Pt very pleasant and cooperative, motivated to participate. Pt does state toward the end of the session, that he hopes the pain is real and not psychological. Pt provided encouragement and support.   Assessment   Limitation Decreased ADL status;Decreased Safe judgement during ADL;Decreased endurance;Decreased high-level ADLs;Decreased self-care trans   Prognosis Fair   Assessment 49 year old pt seen today for an OT evaluation following admission to [facility] 2/2 back pain, lumbar radiculopathy with present symptoms significant for pain, fatigue, weakness, decreased ADL status, decreased functional mobility. Pt  has a past medical history of Cancer (HCC), Cardiac disease, and Ear problems. Pt reported living with spouse in a 2SH, 1STE with bathroom downstairs and ability to stay on recliner if needed. Pt reports being IND with all ADLs/IADLs PTA but has recently had trouble getting on/off toilet, completing LB ADLs 2/2 pain this past week. No DME  used. +. 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  Pt very pleasant and cooperative t/o session. Pt completed functional bed mobility with mod Ax2. Min A for functional STS txfs and functional mobility with RW. Pt was mod A for UB ADLs and max A for LB ADLs 2/2 low back pain. The patient's raw score on the AM-PAC Daily Activity Inpatient Short Form is 14. A raw score of less than 19 suggests the patient may benefit from discharge to post-acute rehabilitation services. Please refer to the recommendation of the Occupational Therapist for safe discharge planning. Pt is functioning below baseline level of function and will continue to benefit from skilled acute OT to promote increased independence and return to PLOF. At this time, current OT recommendation is Level 1 resources. Will continue to follow and assess.   Goals   Patient Goals to have less pain   LTG Time Frame 10-14   Long Term Goal #1 See below for goals   Plan   Treatment Interventions ADL retraining;Functional transfer training;UE strengthening/ROM;Endurance training;Patient/family training;Cognitive reorientation;Equipment evaluation/education;Fine motor coordination activities;Compensatory technique education;Continued evaluation;Energy conservation;Activityengagement   Goal Expiration Date 11/17/24   OT Frequency 2-3x/wk   Discharge Recommendation   Rehab Resource Intensity Level, OT I (Maximum Resource Intensity)   Equipment Recommended Bedside commode;Shower/Tub chair with back ($);Reacher ($);Sock aid ($)   Commode Type Standard   AM-PAC Daily Activity Inpatient   Lower Body Dressing 2   Bathing 2   Toileting 2   Upper Body Dressing 2   Grooming 3   Eating 3   Daily Activity Raw Score 14   Daily Activity Standardized Score (Calc for Raw Score >=11) 33.39   AM-PAC Applied Cognition Inpatient   Following a Speech/Presentation 4   Understanding Ordinary Conversation 4   Taking Medications 4   Remembering Where Things Are Placed or Put Away 4   Remembering List  of 4-5 Errands 4   Taking Care of Complicated Tasks 4   Applied Cognition Raw Score 24   Applied Cognition Standardized Score 62.21   End of Consult   Education Provided Yes   Patient Position at End of Consult Supine;All needs within reach   Nurse Communication Nurse aware of consult   End of Consult Comments RN updated on level of pain.         Occupational Therapy Goals    Pt will be Mod I with bed mobility with good sitting balance/tolerance to engage in self care tasks within this plan of care.      Pt will be Mod I for UB dressing and bathing with use of assistive devices PRN within this plan of care.     Pt will be Mod I for LB dressing and bathing with use of assistive devices PRN within this plan of care.     Pt will demonstrate standing tolerance of 2-3 minutes increase independence for toileting ADLs/tasks with use of assistive devices PRN within this plan of care.     Pt will complete functional transfers with mod I, with use of appropriate assistive devices within this plan of care.     Pt will demonstrate good carryover of safety awareness with use of appropriate assistive devices during functional tasks within this plan of care.     Pt will demonstrated increased activity tolerance to 30 mins for participation in ADLs and functional tasks within this plan of care.     Pt will complete further functional cognitive assessment with good attention/participation to assist with safe d/c planning recommendations.        Alan Cabrales MS, OTR/L     MOCA CERTIFIED RATER ID [id number]

## 2024-11-03 NOTE — PHYSICAL THERAPY NOTE
Physical Therapy Evaluation    Patient's Name: Ihsan Gomez    Admitting Diagnosis  Low back pain [M54.50]  Lumbar radiculopathy [M54.16]  Lower back pain [M54.50]  Lumbosacral disc herniation [M51.27]    Problem List  Patient Active Problem List   Diagnosis    Lumbar radiculopathy    Diabetes mellitus type 2, controlled (HCC)    Dyslipidemia    Low back pain       Past Medical History  Past Medical History:   Diagnosis Date    Cancer (HCC)     Cardiac disease     Ear problems        Past Surgical History  Past Surgical History:   Procedure Laterality Date    AORTIC ARCH REPAIR      CARDIAC SURGERY      ROTATOR CUFF REPAIR Left         11/03/24 1136   PT Last Visit   PT Visit Date 11/03/24   Note Type   Note type Evaluation   Pain Assessment   Pain Assessment Tool 0-10   Pain Score 10 - Worst Possible Pain  (at rest 6/10 LLE (down to knee) and 4/10 back; increased substantially w/ functional assessment)   Pain Location/Orientation Location: Back   Pain Radiating Towards LLE   Pain Onset/Description   (Prior to movement, pt reported that back pain had been improving + radicular sx have been worsening in LLE. Reported pain in both back + LLE w/ movement.)   Hospital Pain Intervention(s) Heat applied;Ambulation/increased activity  (RN pre-medicated for pain)   Restrictions/Precautions   Weight Bearing Precautions Per Order No   Other Precautions Pain;Fall Risk   Home Living   Type of Home House  (colonial)   Home Layout Two level  (1 ANDREA; full bathroom + recliner downstairs; bedroom upstairs)   Bathroom Shower/Tub Walk-in shower   Bathroom Toilet Raised  (RT)   Bathroom Equipment Grab bars around toilet;Grab bars in shower   Home Equipment   (reports he has access to DME from his parents)   Prior Function   Level of [place] Independent with ADLs;Independent with functional mobility;Independent with IADLS  (I community ambulator w/o AD)   Lives With Spouse  (2 dtrs home on the weekends; spouse works during the  day)   Receives Help From Family   IADLs Independent with driving;Independent with meal prep;Independent with medication management   Falls in the last 6 months 0   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   Family/Caregiver Present Yes   Cognition   Arousal/Participation Alert   Orientation Level Oriented X4   Comments pleasant + cooperative, motivated to participate   Subjective   Subjective Agreeable to mobilize.   RLE Assessment   RLE Assessment   (4/5)   LLE Assessment   LLE Assessment WFL   Coordination   Movements are Fluid and Coordinated 0   Coordination and Movement Description slow, guarded, antalgic   Bed Mobility   Rolling L 5  Supervision   Additional items Increased time required;Bedrails   Supine to Sit 3  Moderate assistance   Additional items Assist x 2;Bedrails;Increased time required;Verbal cues;LE management   Sit to Supine 3  Moderate assistance   Additional items Assist x 1;Bedrails;Increased time required;Verbal cues;LE management   Additional Comments Pt greeted in supine. Educated on BLT pxn to minimize pain and application to log roll technique.   Transfers   Sit to Stand 4  Minimal assistance   Additional items Assist x 1;Increased time required;Verbal cues   Stand to Sit 5  Supervision   Additional items Assist x 1;Increased time required;Verbal cues   Additional Comments RW   Ambulation/Elevation   Gait pattern Antalgic;Decreased foot clearance;Improper Weight shift;Excessively slow  (heavy use of BUE through RW)   Gait Assistance 4  Minimal assist   Additional items Assist x 1;Verbal cues;Tactile cues   Assistive Device Rolling walker   Distance 2'   Ambulation/Elevation Additional Comments (S)  limited 2/2 intense pain in low back + LLE   Balance   Static Sitting Poor +   Dynamic Sitting Poor   Static Standing Poor +   Dynamic Standing Poor +   Ambulatory  Poor +  (RW)   Endurance Deficit   Endurance Deficit Yes   Endurance Deficit Description pain   Activity Tolerance   Activity Tolerance (S)  Patient limited by pain   Medical Staff Made Aware OT Alan   Nurse Made Aware yes - cleared + updated   Assessment   Prognosis Excellent   Problem List Decreased strength;Decreased range of motion;Decreased endurance;Impaired balance;Decreased mobility;Pain   Assessment Pt is 49 y.o. male seen for a PT evaluation s/p admit to [facility] on 11/1/2024. Pt presenting w/ lumbar radiculopathy. Please see above for other active problem list / PMH.     PT now consulted to assess functional mobility and needs for safe d/c planning. Prior to admission, pt was I + active w/o AD, lives w/ spouse in a 2SH w/ 1 ANDREA.     Currently pt requires ModAx2-MinAx1 for bed skills; MinAx1 for functional transfers; MinAx1 for 2' ambulation w/ RW. Pt presents functioning below baseline and w/ overall mobility deficits 2* to: decreased LE strength; decreased endurance; impaired balance; gait deviations; pain; fatigue. These impairments place pt at risk for falls.     Pt will continue to benefit from skilled PT interventions to address stated impairments; to maximize functional potential; for ongoing pt/ family training; and DME needs.     PT is currently recommending acute medical rehab for now; likely will be able to progress to OPPT if improved pain control can be achieved while in-house.   Barriers to Discharge Inaccessible home environment;Decreased caregiver support   Goals   Patient Goals less pain, be able to get up and walk   STG Expiration Date 11/17/24   Short Term Goal #1 In 14 days pt will complete: 1) Bed mobility skills with Eunice to facilitate safe return to previous living environment. 2) Functional transfers with Eunice to facilitate safe return to previous living environment. 3) Ambulation with least restrictive ' w/ Eunice without LOB for safe ambulation in home/community  environment. 4) Improve balance scores by 1 grade to decrease fall risk. 5) Improve LE strength grades by 1 to increase independence w/ all functional mobility, transfers and gait. 6) PT for ongoing pt and family education; DME needs and D/C planning to promote highest level of function in least restrictive environment. 7) Stair training up/ down 12 steps with most appropriate technique and Eunice for safe access to previous living environment and to increase community access.   Plan   Treatment/Interventions Functional transfer training;LE strengthening/ROM;Elevations;Therapeutic exercise;Endurance training;Patient/family training;Equipment eval/education;Bed mobility;Gait training;Compensatory technique education;Spoke to nursing;OT;Family   PT Frequency 3-5x/wk   Discharge Recommendation   Rehab Resource Intensity Level, PT I (Maximum Resource Intensity)  (vs Min resources w/ improved pain control)   Equipment Recommended Walker   Walker Package Recommended Wheeled walker   Change/add to Walker Package? No   AM-PAC Basic Mobility Inpatient   Turning in Flat Bed Without Bedrails 3   Lying on Back to Sitting on Edge of Flat Bed Without Bedrails 2   Moving Bed to Chair 2   Standing Up From Chair Using Arms 2   Walk in Room 1   Climb 3-5 Stairs With Railing 1   Basic Mobility Inpatient Raw Score 11   Basic Mobility Standardized Score 30.25   [facility] Highest Level Of Mobility   -HLM Goal 4: Move to chair/commode   -HLM Achieved 5: Stand (1 or more minutes)   End of Consult   Patient Position at End of Consult Supine;All needs within reach  (heating pad to low back, family at bedside)     Yesenia Simmons, PT, DPT

## 2024-11-04 ENCOUNTER — APPOINTMENT (INPATIENT)
Dept: NON INVASIVE DIAGNOSTICS | Facility: HOSPITAL | Age: 49
DRG: 552 | End: 2024-11-04
Payer: COMMERCIAL

## 2024-11-04 ENCOUNTER — TELEPHONE (OUTPATIENT)
Dept: NEUROSURGERY | Facility: CLINIC | Age: 49
End: 2024-11-04

## 2024-11-04 VITALS
RESPIRATION RATE: 16 BRPM | HEIGHT: 69 IN | TEMPERATURE: 98.4 F | OXYGEN SATURATION: 94 % | SYSTOLIC BLOOD PRESSURE: 124 MMHG | BODY MASS INDEX: 33.8 KG/M2 | HEART RATE: 69 BPM | WEIGHT: 228.18 LBS | DIASTOLIC BLOOD PRESSURE: 76 MMHG

## 2024-11-04 LAB
GLUCOSE SERPL-MCNC: 136 MG/DL (ref 65–140)
GLUCOSE SERPL-MCNC: 200 MG/DL (ref 65–140)

## 2024-11-04 PROCEDURE — 93971 EXTREMITY STUDY: CPT

## 2024-11-04 PROCEDURE — 99232 SBSQ HOSP IP/OBS MODERATE 35: CPT | Performed by: PHYSICIAN ASSISTANT

## 2024-11-04 PROCEDURE — 99254 IP/OBS CNSLTJ NEW/EST MOD 60: CPT | Performed by: ANESTHESIOLOGY

## 2024-11-04 PROCEDURE — 99239 HOSP IP/OBS DSCHRG MGMT >30: CPT | Performed by: FAMILY MEDICINE

## 2024-11-04 PROCEDURE — 93971 EXTREMITY STUDY: CPT | Performed by: SURGERY

## 2024-11-04 PROCEDURE — 97530 THERAPEUTIC ACTIVITIES: CPT

## 2024-11-04 PROCEDURE — 82948 REAGENT STRIP/BLOOD GLUCOSE: CPT

## 2024-11-04 PROCEDURE — 97110 THERAPEUTIC EXERCISES: CPT

## 2024-11-04 PROCEDURE — 97116 GAIT TRAINING THERAPY: CPT

## 2024-11-04 RX ORDER — POLYETHYLENE GLYCOL 3350 17 G/17G
17 POWDER, FOR SOLUTION ORAL DAILY
Start: 2024-11-05

## 2024-11-04 RX ORDER — METHOCARBAMOL 750 MG/1
750 TABLET, FILM COATED ORAL EVERY 8 HOURS SCHEDULED
Qty: 30 TABLET | Refills: 0 | Status: SHIPPED | OUTPATIENT
Start: 2024-11-04

## 2024-11-04 RX ORDER — GABAPENTIN 100 MG/1
100 CAPSULE ORAL 2 TIMES DAILY
Qty: 60 CAPSULE | Refills: 0 | Status: SHIPPED | OUTPATIENT
Start: 2024-11-04

## 2024-11-04 RX ORDER — SENNOSIDES 8.6 MG
17.2 TABLET ORAL
Start: 2024-11-04

## 2024-11-04 RX ORDER — HYDROMORPHONE HYDROCHLORIDE 4 MG/1
4 TABLET ORAL EVERY 4 HOURS PRN
Status: DISCONTINUED | OUTPATIENT
Start: 2024-11-04 | End: 2024-11-04 | Stop reason: HOSPADM

## 2024-11-04 RX ORDER — LIDOCAINE 50 MG/G
1 PATCH TOPICAL DAILY
Qty: 14 PATCH | Refills: 0 | Status: SHIPPED | OUTPATIENT
Start: 2024-11-05

## 2024-11-04 RX ORDER — IBUPROFEN 400 MG/1
400 TABLET, FILM COATED ORAL EVERY 6 HOURS PRN
Qty: 30 TABLET | Refills: 0 | Status: SHIPPED | OUTPATIENT
Start: 2024-11-04

## 2024-11-04 RX ORDER — ACETAMINOPHEN 325 MG/1
975 TABLET ORAL EVERY 8 HOURS SCHEDULED
Qty: 30 TABLET | Refills: 0 | Status: SHIPPED | OUTPATIENT
Start: 2024-11-04

## 2024-11-04 RX ORDER — HYDROMORPHONE HCL/PF 1 MG/ML
0.5 SYRINGE (ML) INJECTION EVERY 4 HOURS PRN
Status: DISCONTINUED | OUTPATIENT
Start: 2024-11-04 | End: 2024-11-04 | Stop reason: HOSPADM

## 2024-11-04 RX ORDER — HYDROMORPHONE HYDROCHLORIDE 4 MG/1
4 TABLET ORAL EVERY 4 HOURS PRN
Qty: 20 TABLET | Refills: 0 | Status: SHIPPED | OUTPATIENT
Start: 2024-11-04 | End: 2024-11-14

## 2024-11-04 RX ORDER — METHYLPREDNISOLONE 4 MG/1
TABLET ORAL
Qty: 3 TABLET | Refills: 0 | Status: SHIPPED | OUTPATIENT
Start: 2024-11-05 | End: 2024-11-06

## 2024-11-04 RX ADMIN — LIDOCAINE 1 PATCH: 50 PATCH CUTANEOUS at 08:40

## 2024-11-04 RX ADMIN — INSULIN LISPRO 1 UNITS: 100 INJECTION, SOLUTION INTRAVENOUS; SUBCUTANEOUS at 13:24

## 2024-11-04 RX ADMIN — ACETAMINOPHEN 975 MG: 325 TABLET, FILM COATED ORAL at 13:21

## 2024-11-04 RX ADMIN — ACETAMINOPHEN 975 MG: 325 TABLET, FILM COATED ORAL at 06:01

## 2024-11-04 RX ADMIN — METHOCARBAMOL 1000 MG: 500 TABLET ORAL at 06:00

## 2024-11-04 RX ADMIN — METHOCARBAMOL 1000 MG: 500 TABLET ORAL at 13:21

## 2024-11-04 RX ADMIN — GABAPENTIN 100 MG: 100 CAPSULE ORAL at 08:21

## 2024-11-04 RX ADMIN — HYDROMORPHONE HYDROCHLORIDE 4 MG: 4 TABLET ORAL at 06:00

## 2024-11-04 RX ADMIN — METHYLPREDNISOLONE 12 MG: 4 TABLET ORAL at 08:21

## 2024-11-04 RX ADMIN — HYDROMORPHONE HYDROCHLORIDE 4 MG: 4 TABLET ORAL at 01:46

## 2024-11-04 RX ADMIN — CHOLECALCIFEROL TAB 10 MCG (400 UNIT) 400 UNITS: 10 TAB at 08:28

## 2024-11-04 RX ADMIN — LIDOCAINE 1 PATCH: 50 PATCH TOPICAL at 08:21

## 2024-11-04 RX ADMIN — ENOXAPARIN SODIUM 40 MG: 40 INJECTION SUBCUTANEOUS at 08:21

## 2024-11-04 RX ADMIN — ASPIRIN 81 MG: 81 TABLET, COATED ORAL at 08:21

## 2024-11-04 RX ADMIN — HYDROMORPHONE HYDROCHLORIDE 1 MG: 1 INJECTION, SOLUTION INTRAMUSCULAR; INTRAVENOUS; SUBCUTANEOUS at 03:40

## 2024-11-04 NOTE — PROGRESS NOTES
"Progress Note - Neurosurgery   Name: Ihsan Gomez 49 y.o. male I MRN: [id number]  Unit/Bed#: -01 I Date of Admission: 2024   Date of Service: 2024 I Hospital Day: 3    Assessment & Plan  Lumbar radiculopathy  LBP w/ LLE radiculopathy   Presented to the ER w/ severe LBP w/ radiation to the LE in an S1 distribution w/ pain limited weakness and trouble walking   Has a hx of LBP w/ intermittent flares of sciatica, improved with steroids in the past   Following with [facility] who he saw on 10/31 and underwent trigger point injection and was prescribed a PO steroids and a short course of tramadol without relief   MRI L-spine reveals a L sided L5-S1 disc herniation   Pt admitted for pain control and recommended a trial of conservative management   Upon reassessment today, pt with significantly improved pain and mobility. Pt is hoping for d/c home and to avoid any surgeries at this time   Denies any weakness, numbness, BBI, urinary retention, saddle anesthesia, inability to ambulate  No prior lumbar surgeries      Imagin2024 MRI L-spine: mild motion artifact is present.L5-S1: Left subarticular disc extrusion compresses left S1 descending nerve root resulting in mild canal stenosis. Consider consultation with spine surgery.Multilevel degenerative changes of lumbar spine with varying degrees of canal stenosis (mild L5-S1) and foraminal narrowing (severe left L5-S1; moderate right L5-S1), as detailed above.     Plan:   Continue to closely monitor neuro exam   Frequent neuro checks per primary team   Maintain normotensive BP goals, SBP < 160, MAP > 65   No acute neuro surgical intervention indicated at this time   Case and imaging reviewed this am on rounds w/ Dr. Blanca   MRI L-spine reveals a left-sided L5-S1 disc herniation that does explain the patient's presenting symptoms  Thankfully patient remains neurologically intact with significantly improved pain/presenting symptoms  Denies any \"red flag\" " signs/symptoms  Recommend ongoing conservative management  Continue current multimodal pain medication regimen  Recommend outpatient referral to PT  Recommend outpatient referral to [facility] and pain management  Strongly consider outpatient lumbar SHEY at L5-S1 on the left at pain management provider discretion  Will plan to follow with the patient again in approximately 4 weeks in the office with Dr. Rianna cabrera to assess his progress with conservative management.  Should patient continue to fail these measures or have significant/worsening pain, can consider elective neurosurgical intervention at that time.  DVT ppx: SCDs, SQ lovenox   Medical management per primary team   Pain control per primary team   PT/OT   Social work following for assistance with dispo once medically cleared     Neurosurgery will sign off at this time.  Will plan to follow the patient as an outpatient in clinic. Please reach out with any further questions or concerns.     Please contact the SecureChat role for the Neurosurgery service with any questions/concerns.    Subjective   Pt seen and examined this am on rounds.  No acute events overnight.  Patient reports significantly improved symptoms today.  Patient denies any red flag signs or symptoms.  Patient is hopeful for discharge to home.    Objective :  Temp:  [98.1 °F (36.7 °C)-98.4 °F (36.9 °C)] 98.4 °F (36.9 °C)  HR:  [69-73] 69  BP: (121-128)/(64-76) 124/76  Resp:  [16] 16  SpO2:  [91 %-94 %] 94 %    I/O         11/02 0701  11/03 0700 11/03 0701  11/04 0700 11/04 0701  11/05 0700    P.O. 150 780     Total Intake(mL/kg) 150 (1.5) 780 (7.6)     Urine (mL/kg/hr) 1200 (0.5) 1250 (0.5)     Stool  0     Total Output 1200 1250     Net -1050 -470            Unmeasured Stool Occurrence  1 x           Physical Exam   General appearance: alert, appears stated age, cooperative and no distress  Head: Normocephalic, without obvious abnormality, atraumatic  Eyes: EOMI, PERRL  Neck: supple,  symmetrical, trachea midline and NT  Back: no kyphosis present, no tenderness to percussion or palpation  Lungs: non labored breathing, no respiratory distress on room air  Heart: regular heart rate  Neurologic:   Mental status: Alert, oriented x3, thought content appropriate  Cranial nerves: grossly intact (Cranial nerves II-XII)  Sensory: normal to light touch bilaterally throughout  Motor: moving all extremities without focal weakness   Reflexes: 2+ and symmetric  Coordination: finger to nose normal bilaterally, no drift bilaterally        Lab Results: I have reviewed the following results:  Recent Labs     11/01/24  1123 11/01/24  1315   PLT  --  193   HSTNI2 <2  --        VTE Pharmacologic Prophylaxis: Sequential compression device (Venodyne)  and Enoxaparin (Lovenox)

## 2024-11-04 NOTE — UTILIZATION REVIEW
Continued Stay Review    Date: 11/4/24                           Current Patient Class: Inpatient  Current Level of Care: MS     HPI:49 y.o. male initially admitted on 11/1/24  . Lumbar radiculopathy .  MRI L spine showed Left sided L5 S1 disc herniation.   No surgical intervention as per neurosurgery . Started on medrol dose kody. Pain control. PT/OT .     Assessment/Plan:   11/3.  Pain better controlled on current regimen , appears to be using less of the PRNs today. Pt still with decreased mobility secondary to the pain. PT evaluation appreciated. APS consultation pending  Complaining of new shooting pain behind the left knee which is bothersome for him. Added bowel regimen  . Patient is worried about the thought of getting up and going to the bathroom   PT/ OT - level I, maximum rehab resource intensity . Per PT Currently pt requires ModAx2-MinAx1 for bed skills; MinAx1 for functional transfers; MinAx1 for 2' ambulation w/ RW. Pt presents functioning below baseline and w/ overall mobility deficits       11/4    Pt ordered venous duplex LLE  today w/ L knee pain    APS consult - He states most of the pain is low back that is radicular in nature down the left lower extremity down to the calf. PLan- decrease IV Dilaudid to 0.5 mg prn from 1 mg prn. Increase po Dilaudid to 4/6 mg q4 for moderate to severe pain. Continue medrol dose taper. Scheduled tylenol. Gabapentin. Robaxin. Lido patch.   Neurosx-improved pain and mobility. Pt is hoping for d/c home and to avoid any surgeries at this time . Denies any weakness, numbness, BBI, urinary retention, saddle anesthesia, inability to ambulate. Recommend  ongoing conservative management . Multimodal pain control. Recommend outpatient referral to [facility] and pain management         Medications:   Scheduled Medications:  acetaminophen, 975 mg, Oral, Q8H CURTIS  aspirin, 81 mg, Oral, Daily  atorvastatin, 10 mg, Oral, Daily With Dinner  cholecalciferol, 400 Units, Oral,  Daily  enoxaparin, 40 mg, Subcutaneous, Daily  gabapentin, 100 mg, Oral, BID  insulin glargine, 15 Units, Subcutaneous, HS  insulin lispro, 1-5 Units, Subcutaneous, 4x Daily (PC & HS)  lidocaine, 1 patch, Topical, Daily  lidocaine, 1 patch, Topical, Daily  methocarbamol, 1,000 mg, Oral, Q8H CURTIS  [START ON 11/5/2024] methylPREDNISolone, 8 mg, Oral, Daily   Followed by  [START ON 11/6/2024] methylPREDNISolone, 4 mg, Oral, Daily  polyethylene glycol, 17 g, Oral, Daily  senna, 2 tablet, Oral, HS    methocarbamol (ROBAXIN) tablet 750 mg  Dose: 750 mg  Freq: Every 8 hours scheduled Route: PO  Start: 11/01/24 1400 End: 11/03/24 1616  methylprednisolone (MEDROL) tablet 20 mg  Dose: 20 mg  Freq: Daily Route: PO  Start: 11/02/24 0900 End: 11/02/24 0818   methylprednisolone (MEDROL) tablet 16 mg  Dose: 16 mg  Freq: Daily Route: PO  Start: 11/03/24 0900 End: 11/03/24 0836   Admin Instructions:         methylprednisolone (MEDROL) tablet 12 mg  Dose: 12 mg  Freq: Daily Route: PO  Start: 11/04/24 0900 End: 11/04/24 0821              Continuous IV Infusions:     PRN Meds:  HYDROmorphone, 0.5 mg, Intravenous, Q4H PRN  HYDROmorphone, 4 mg, Oral, Q4H PRN x3 11/3, x 2 11/4   HYDROmorphone, 6 mg, Oral, Q4H PRN  ondansetron, 4 mg, Intravenous, Q6H PRN    HYDROmorphone (DILAUDID) injection 1 mg  Dose: 1 mg  Freq: Every 4 hours PRN Route: IV  PRN Reason: breakthrough pain  Start: 11/01/24 1209 End: 11/04/24 1012 x2 11/3, x1 11/4        Discharge Plan: TBD    Vital Signs (last 3 days)       Date/Time Temp Pulse Resp BP MAP (mmHg) SpO2 O2 Device Patient Position - Orthostatic VS [place] Coma Scale Score Pain    11/04/24 1020 -- -- -- -- -- -- -- -- -- No Pain    11/04/24 07:33:38 98.4 °F (36.9 °C) 69 -- 124/76 92 94 % -- -- -- --    11/04/24 0600 -- -- -- -- -- -- -- -- -- 7 11/04/24 0340 -- -- -- -- -- -- -- -- -- 8    11/04/24 0146 -- -- -- -- -- -- -- -- -- 7 11/03/24 22:25:13 98.2 °F (36.8 °C) 71 16 128/64 85 91 % -- -- -- --     11/03/24 2124 -- -- -- -- -- -- -- -- -- 8 11/03/24 2022 -- -- -- -- -- -- -- -- 15 --    11/03/24 1945 -- -- -- -- -- -- -- -- -- 7 11/03/24 1445 -- -- -- -- -- -- -- -- -- 7 11/03/24 14:39:51 98.1 °F (36.7 °C) 73 16 121/73 89 93 % -- -- -- --    11/03/24 1136 -- -- -- -- -- -- -- -- -- 10 - Worst Possible Pain    11/03/24 1135 -- -- -- -- -- -- -- -- -- 10 - Worst Possible Pain    11/03/24 1046 -- -- -- -- -- -- -- -- -- 7 11/03/24 0900 -- -- -- -- -- -- None (Room air) -- 15 --    11/03/24 07:16:12 98.1 °F (36.7 °C) 73 16 124/70 88 95 % -- -- -- --    11/03/24 0515 -- -- -- -- -- -- -- -- -- 8 11/02/24 2341 -- -- -- -- -- -- -- -- -- 7 11/02/24 2310 -- -- -- -- -- 92 % None (Room air) -- 15 --    11/02/24 22:18:07 97.9 °F (36.6 °C) 71 17 128/72 91 94 % -- -- -- --    11/02/24 2010 -- -- -- -- -- -- -- -- -- 8 11/02/24 1547 -- -- -- -- -- -- -- -- -- 8 11/02/24 15:21:41 99.5 °F (37.5 °C) 69 17 131/73 92 93 % -- -- -- --    11/02/24 1325 -- -- -- -- -- -- -- -- -- 10 - Worst Possible Pain    11/02/24 0921 -- -- -- -- -- -- -- -- -- 10 - Worst Possible Pain    11/02/24 0738 -- -- -- -- -- -- None (Room air) -- 15 --    11/02/24 07:27:20 97.9 °F (36.6 °C) 74 16 110/60 77 94 % -- -- -- --    11/02/24 0558 -- -- -- -- -- -- -- -- -- 8    11/02/24 03:08:13 98.3 °F (36.8 °C) 86 18 124/76 92 93 % -- -- -- --    11/01/24 2300 -- -- -- -- -- -- None (Room air) -- 15 No Pain    11/01/24 21:43:45 97.7 °F (36.5 °C) 72 20 113/70 84 95 % -- -- -- --    11/01/24 2128 -- -- -- -- -- -- -- -- -- 7    11/01/24 1753 -- -- -- -- -- -- None (Room air) -- 15 --    11/01/24 17:33:49 97.3 °F (36.3 °C) 67 16 126/82 97 96 % -- -- -- 10 - Worst Possible Pain    11/01/24 1733 -- -- -- -- -- -- -- -- -- 10 - Worst Possible Pain    11/01/24 1617 -- 65 18 110/58 -- 95 % -- -- -- --    11/01/24 1533 -- -- -- -- -- -- -- -- -- 8    11/01/24 1303 -- -- -- -- -- -- -- -- -- 6    11/01/24 1122 -- -- -- -- -- -- -- -- -- 8     11/01/24 1051 -- 64 20 108/67 81 95 % -- -- -- --    11/01/24 0952 -- -- -- -- -- -- -- -- -- 7    11/01/24 0927 -- -- -- -- -- -- -- -- 15 --    11/01/24 0915 -- 70 18 121/58 84 95 % -- Lying -- --    11/01/24 0910 -- 74 20 120/60 -- 95 % -- Lying -- --    11/01/24 0829 97.3 °F (36.3 °C) 88 18 132/66 -- 97 % None (Room air) Sitting -- 10 - Worst Possible Pain          Weight (last 2 days)       None            Pertinent Labs/Diagnostic Results:   Radiology:     VAS VENOUS DUPLEX -LOWER LIMB UNILATERAL    (Results Pending-11/4/24              Results from last 7 days   Lab Units 11/01/24  1315 11/01/24  0912   WBC Thousand/uL  --  7.18   HEMOGLOBIN g/dL  --  13.6   HEMATOCRIT %  --  39.8   PLATELETS Thousands/uL 193 186   TOTAL NEUT ABS Thousands/µL  --  4.87         Results from last 7 days   Lab Units 11/01/24  0912   SODIUM mmol/L 135   POTASSIUM mmol/L 4.1   CHLORIDE mmol/L 103   CO2 mmol/L 25   ANION GAP mmol/L 7   BUN mg/dL 20   CREATININE mg/dL 0.66   EGFR ml/min/1.73sq m 111   CALCIUM mg/dL 8.9     Results from last 7 days   Lab Units 11/01/24  0912   AST U/L 13   ALT U/L 19   ALK PHOS U/L 68   TOTAL PROTEIN g/dL 7.0   ALBUMIN g/dL 4.3   TOTAL BILIRUBIN mg/dL 0.45     Results from last 7 days   Lab Units 11/04/24  1037 11/04/24  0535 11/03/24  2009 11/03/24  1601 11/03/24  1040 11/03/24  0610 11/02/24  2036 11/02/24  1536 11/02/24  1106 11/02/24  0537 11/01/24  1736 11/01/24  1300   POC GLUCOSE mg/dl 200* 136 171* 190* 146* 127 165* 178* 127 154* 158* 154*     Results from last 7 days   Lab Units 11/01/24  0912   GLUCOSE RANDOM mg/dL 215*         Results from last 7 days   Lab Units 11/01/24  1315   HEMOGLOBIN A1C % 7.3*   EAG mg/dl 163           Results from last 7 days   Lab Units 11/01/24  1316 11/01/24  1123 11/01/24  0912   HS TNI 0HR ng/L  --   --  <2   HS TNI 2HR ng/L  --  <2  --    HS TNI 4HR ng/L <2  --   --                  Results from last 7 days   Lab Units 11/01/24  0912   BNP pg/mL 47              Network Utilization Review Department  ATTENTION: Please call with any questions or concerns to [phone] and carefully listen to the prompts so that you are directed to the right person. All voicemails are confidential.   For Discharge needs, contact Care Management DC Support Team at [phone] opt. [phone]  Send all requests for admission clinical reviews, approved or denied determinations and any other requests to dedicated fax number below belonging to the campus where the patient is receiving treatment. List of dedicated fax numbers for the Facilities:  FACILITY NAME UR FAX NUMBER   ADMISSION DENIALS (Administrative/Medical Necessity) [phone]   DISCHARGE SUPPORT TEAM (NETWORK) [phone]   PARENT CHILD HEALTH (Maternity/NICU/Pediatrics) [phone]   [facility] [phone]   [facility] [phone]   [facility] [phone]   [facility] [phone]   [facility] [phone]   [facility] [phone]   [facility] [phone]   [facility] [phone]   [facility] [phone]   Pending sale to [facility] [phone]   [facility] [phone]   [facility] [phone]

## 2024-11-04 NOTE — PHYSICAL THERAPY NOTE
"       PHYSICAL THERAPY TREATMENT  NAME:  Ihsan Gomez  DATE: 11/04/24    AGE:   49 y.o.  Mrn:   [id number]  ADMIT DX:  Low back pain [M54.50]  Lumbar radiculopathy [M54.16]  Lower back pain [M54.50]  Lumbosacral disc herniation [M51.27]    Past Medical History:   Diagnosis Date    Cancer (HCC)     Cardiac disease     Ear problems      Past Surgical History:   Procedure Laterality Date    AORTIC ARCH REPAIR      CARDIAC SURGERY      ROTATOR CUFF REPAIR Left        Length Of Stay: 3        11/04/24 1019   PT Last Visit   PT Visit Date 11/04/24   Note Type   Note Type Treatment   Pain Assessment   Pain Assessment Tool 0-10   Pain Score 3  ((increases to 4-5/10 down LLE to buttock level w/ stairs)- pt reprts much improved from yesterdays session)   Restrictions/Precautions   Weight Bearing Precautions Per Order No   Other Precautions Pain;Fall Risk   General   Chart Reviewed Yes   Additional Pertinent History pt reports being up and ambualtory w/ RN this am and pain has significantly improved. he would like to go home w/ family   Family/Caregiver Present No   Cognition   Overall Cognitive Status WFL   Arousal/Participation Alert;Cooperative   Attention Within functional limits   Orientation Level Oriented X4   Memory Within functional limits   Following Commands Follows all commands and directions without difficulty   Comments pleasant and cooperarative- good motivation and receptive to education on HNP/ positioning and body mechanics- good understanding noted   Subjective   Subjective \"I feel so much better; it still hurts but I can move\"   Bed Mobility   Supine to Sit 5  Supervision   Sit to Supine 5  Supervision   Transfers   Sit to Stand 5  Supervision   Stand to Sit 5  Supervision   Additional Comments w/ RW   Ambulation/Elevation   Gait Assistance 5  Supervision   Assistive Device Rolling walker   Distance 100'+100 slower / guarded gait - pain remaining centralized w/ ambualtion; 0 LOB   Stair Management " Assistance 5  Supervision   Additional items Verbal cues   Stair Management Technique One rail R;Foreward;Step to pattern   Number of Stairs 8   Balance   Static Sitting Good   Dynamic Sitting Fair +   Static Standing Fair   Dynamic Standing Fair   Ambulatory Fair  (rw)   Endurance Deficit   Endurance Deficit Yes   Endurance Deficit Description pain / muscle spasm   Activity Tolerance   Activity Tolerance Patient limited by pain   Medical Staff Made Aware RN and CM (Opal)   Nurse Made Aware yes- cleared for d/c home w/ OPPT and RW- CM made aware via secure chat   Exercises   Balance training  performed nerve gliding; also pt was educated in prone positioning and was able to get into prone w/o Assist and tolerated x`7 mins w/ full resolution of pain and radicular sx post gait and stair training.   Assessment   Prognosis Excellent   Problem List Decreased endurance;Impaired balance;Decreased range of motion;Decreased mobility;Pain   Assessment pt see for session as above. pt w/ significant inprovement in LBP and radicular sx from yesterday. pt able to mobilize w/ RW and S; negotiate steps w/ some incraesed in radicular sx in LE. resolved w/ prone positioning. Pt eager to go home w/ family. given pt's significnat progress and pain controlled. PT clearing pt for home w/ RW and OPPT referral . Pt pleased w/ progress and thankful for education on positioning this session.   Barriers to Discharge Inaccessible home environment;Decreased caregiver support   Goals   Patient Goals to go home   STG Expiration Date 11/17/24   PT Treatment Day 1   Plan   Treatment/Interventions ADL retraining;Functional transfer training;LE strengthening/ROM;Elevations;Therapeutic exercise;Endurance training;Equipment eval/education;Bed mobility;Gait training;Compensatory technique education;Spoke to nursing;Spoke to case management   Progress Improving as expected   PT Frequency 3-5x/wk   Discharge Recommendation   Rehab Resource Intensity Level,  PT (S)  III (Minimum Resource Intensity)  (home w/ OPPT/ RW)   Equipment Recommended Walker   Walker Package Recommended Wheeled walker   AM-PAC Basic Mobility Inpatient   Turning in Flat Bed Without Bedrails 4   Lying on Back to Sitting on Edge of Flat Bed Without Bedrails 4   Moving Bed to Chair 4   Standing Up From Chair Using Arms 4   Walk in Room 3   Climb 3-5 Stairs With Railing 3   Basic Mobility Inpatient Raw Score 22   Basic Mobility Standardized Score 47.4   [facility] Level Of Mobility   -HLM Goal 7: Walk 25 feet or more   -HLM Achieved 7: Walk 25 feet or more   End of Consult   Patient Position at End of Consult Supine;All needs within reach   The patient's AM-PAC Basic Mobility Inpatient Short Form Raw Score is 22. A Raw score of greater than 16 suggests the patient may benefit from discharge to home. Please also refer to the recommendation of the Physical Therapist for safe discharge planning.

## 2024-11-04 NOTE — PLAN OF CARE
Problem: Potential for Falls  Goal: Patient will remain free of falls  Description: INTERVENTIONS:  - Educate patient/family on patient safety including physical limitations  - Instruct patient to call for assistance with activity   - Consult OT/PT to assist with strengthening/mobility   - Keep Call bell within reach  - Keep bed low and locked with side rails adjusted as appropriate  - Keep care items and personal belongings within reach  - Initiate and maintain comfort rounds  - Make Fall Risk Sign visible to staff  - Offer Toileting every 2 Hours, in advance of need  - Initiate/Maintain alarm  - Obtain necessary fall risk management equipment:   - Apply yellow socks and bracelet for high fall risk patients  - Consider moving patient to room near nurses station  Outcome: Progressing     Problem: Prexisting or High Potential for Compromised Skin Integrity  Goal: Skin integrity is maintained or improved  Description: INTERVENTIONS:  - Identify patients at risk for skin breakdown  - Assess and monitor skin integrity  - Assess and monitor nutrition and hydration status  - Monitor labs   - Assess for incontinence   - Turn and reposition patient  - Assist with mobility/ambulation  - Relieve pressure over bony prominences  - Avoid friction and shearing  - Provide appropriate hygiene as needed including keeping skin clean and dry  - Evaluate need for skin moisturizer/barrier cream  - Collaborate with interdisciplinary team   - Patient/family teaching  - Consider wound care consult   Outcome: Progressing     Problem: PAIN - ADULT  Goal: Verbalizes/displays adequate comfort level or baseline comfort level  Description: Interventions:  - Encourage patient to monitor pain and request assistance  - Assess pain using appropriate pain scale  - Administer analgesics based on type and severity of pain and evaluate response  - Implement non-pharmacological measures as appropriate and evaluate response  - Consider cultural and  social influences on pain and pain management  - Notify physician/advanced practitioner if interventions unsuccessful or patient reports new pain  Outcome: Progressing     Problem: INFECTION - ADULT  Goal: Absence or prevention of progression during hospitalization  Description: INTERVENTIONS:  - Assess and monitor for signs and symptoms of infection  - Monitor lab/diagnostic results  - Monitor all insertion sites, i.e. indwelling lines, tubes, and drains  - Monitor endotracheal if appropriate and nasal secretions for changes in amount and color  - [place] appropriate cooling/warming therapies per order  - Administer medications as ordered  - Instruct and encourage patient and family to use good hand hygiene technique  - Identify and instruct in appropriate isolation precautions for identified infection/condition  Outcome: Progressing  Goal: Absence of fever/infection during neutropenic period  Description: INTERVENTIONS:  - Monitor WBC    Outcome: Progressing     Problem: SAFETY ADULT  Goal: Patient will remain free of falls  Description: INTERVENTIONS:  - Educate patient/family on patient safety including physical limitations  - Instruct patient to call for assistance with activity   - Consult OT/PT to assist with strengthening/mobility   - Keep Call bell within reach  - Keep bed low and locked with side rails adjusted as appropriate  - Keep care items and personal belongings within reach  - Initiate and maintain comfort rounds  - Make Fall Risk Sign visible to staff  - Offer Toileting every 2 Hours, in advance of need  - Initiate/Maintain alarm  - Obtain necessary fall risk management equipment:   - Apply yellow socks and bracelet for high fall risk patients  - Consider moving patient to room near nurses station  Outcome: Progressing  Goal: Maintain or return to baseline ADL function  Description: INTERVENTIONS:  -  Assess patient's ability to carry out ADLs; assess patient's baseline for ADL function and  identify physical deficits which impact ability to perform ADLs (bathing, care of mouth/teeth, toileting, grooming, dressing, etc.)  - Assess/evaluate cause of self-care deficits   - Assess range of motion  - Assess patient's mobility; develop plan if impaired  - Assess patient's need for assistive devices and provide as appropriate  - Encourage maximum independence but intervene and supervise when necessary  - Involve family in performance of ADLs  - Assess for home care needs following discharge   - Consider OT consult to assist with ADL evaluation and planning for discharge  - Provide patient education as appropriate  Outcome: Progressing  Goal: Maintains/Returns to pre admission functional level  Description: INTERVENTIONS:  - Perform AM-PAC 6 Click Basic Mobility/ Daily Activity assessment daily.  - Set and communicate daily mobility goal to care team and patient/family/caregiver.   - Collaborate with rehabilitation services on mobility goals if consulted  - Perform Range of Motion 3 times a day.  - Reposition patient every 2 hours.  - Dangle patient 3 times a day  - Stand patient 3 times a day  - Ambulate patient 3 times a day  - Out of bed to chair 3 times a day   - Out of bed for meals 3 times a day  - Out of bed for toileting  - Record patient progress and toleration of activity level   Outcome: Progressing     Problem: DISCHARGE PLANNING  Goal: Discharge to home or other facility with appropriate resources  Description: INTERVENTIONS:  - Identify barriers to discharge w/patient and caregiver  - Arrange for needed discharge resources and transportation as appropriate  - Identify discharge learning needs (meds, wound care, etc.)  - Arrange for interpretive services to assist at discharge as needed  - Refer to Case Management Department for coordinating discharge planning if the patient needs post-hospital services based on physician/advanced practitioner order or complex needs related to functional status,  cognitive ability, or social support system  Outcome: Progressing     Problem: Knowledge Deficit  Goal: Patient/family/caregiver demonstrates understanding of disease process, treatment plan, medications, and discharge instructions  Description: Complete learning assessment and assess knowledge base.  Interventions:  - Provide teaching at level of understanding  - Provide teaching via preferred learning methods  Outcome: Progressing

## 2024-11-04 NOTE — PLAN OF CARE
Problem: Potential for Falls  Goal: Patient will remain free of falls  Description: INTERVENTIONS:  - Educate patient/family on patient safety including physical limitations  - Instruct patient to call for assistance with activity   - Consult OT/PT to assist with strengthening/mobility   - Keep Call bell within reach  - Keep bed low and locked with side rails adjusted as appropriate  - Keep care items and personal belongings within reach  - Initiate and maintain comfort rounds  - Make Fall Risk Sign visible to staff  - Offer Toileting every 2 Hours, in advance of need  - Initiate/Maintain alarm  - Obtain necessary fall risk management equipment:   - Apply yellow socks and bracelet for high fall risk patients  - Consider moving patient to room near nurses station  11/4/2024 1456 by Porsha Quiles, RN  Outcome: Adequate for Discharge       Problem: Prexisting or High Potential for Compromised Skin Integrity  Goal: Skin integrity is maintained or improved  Description: INTERVENTIONS:  - Identify patients at risk for skin breakdown  - Assess and monitor skin integrity  - Assess and monitor nutrition and hydration status  - Monitor labs   - Assess for incontinence   - Turn and reposition patient  - Assist with mobility/ambulation  - Relieve pressure over bony prominences  - Avoid friction and shearing  - Provide appropriate hygiene as needed including keeping skin clean and dry  - Evaluate need for skin moisturizer/barrier cream  - Collaborate with interdisciplinary team   - Patient/family teaching  - Consider wound care consult   11/4/2024 1456 by Porsha Quiles, RN  Outcome: Adequate for Discharge       Problem: PAIN - ADULT  Goal: Verbalizes/displays adequate comfort level or baseline comfort level  Description: Interventions:  - Encourage patient to monitor pain and request assistance  - Assess pain using appropriate pain scale  - Administer analgesics based on type and severity of pain and evaluate response  -  Good postoperative appearance. Implement non-pharmacological measures as appropriate and evaluate response  - Consider cultural and social influences on pain and pain management  - Notify physician/advanced practitioner if interventions unsuccessful or patient reports new pain  11/4/2024 1456 by Porsha Quiles RN  Outcome: Adequate for Discharge       Problem: INFECTION - ADULT  Goal: Absence or prevention of progression during hospitalization  Description: INTERVENTIONS:  - Assess and monitor for signs and symptoms of infection  - Monitor lab/diagnostic results  - Monitor all insertion sites, i.e. indwelling lines, tubes, and drains  - Monitor endotracheal if appropriate and nasal secretions for changes in amount and color  - [place] appropriate cooling/warming therapies per order  - Administer medications as ordered  - Instruct and encourage patient and family to use good hand hygiene technique  - Identify and instruct in appropriate isolation precautions for identified infection/condition  11/4/2024 1456 by Porsha Quiles RN  Outcome: Adequate for Discharge    Goal: Absence of fever/infection during neutropenic period  Description: INTERVENTIONS:  - Monitor WBC    11/4/2024 1456 by Porsha Quiles RN  Outcome: Adequate for Discharge    Problem: SAFETY ADULT  Goal: Patient will remain free of falls  Description: INTERVENTIONS:  - Educate patient/family on patient safety including physical limitations  - Instruct patient to call for assistance with activity   - Consult OT/PT to assist with strengthening/mobility   - Keep Call bell within reach  - Keep bed low and locked with side rails adjusted as appropriate  - Keep care items and personal belongings within reach  - Initiate and maintain comfort rounds  - Make Fall Risk Sign visible to staff  - Offer Toileting every 2 Hours, in advance of need  - Initiate/Maintain alarm  - Obtain necessary fall risk management equipment:   - Apply yellow socks and bracelet for high fall risk patients  - Consider  moving patient to room near nurses station  11/4/2024 1456 by Porsha Quiles RN  Outcome: Adequate for Discharge    Goal: Maintain or return to baseline ADL function  Description: INTERVENTIONS:  -  Assess patient's ability to carry out ADLs; assess patient's baseline for ADL function and identify physical deficits which impact ability to perform ADLs (bathing, care of mouth/teeth, toileting, grooming, dressing, etc.)  - Assess/evaluate cause of self-care deficits   - Assess range of motion  - Assess patient's mobility; develop plan if impaired  - Assess patient's need for assistive devices and provide as appropriate  - Encourage maximum independence but intervene and supervise when necessary  - Involve family in performance of ADLs  - Assess for home care needs following discharge   - Consider OT consult to assist with ADL evaluation and planning for discharge  - Provide patient education as appropriate  11/4/2024 1456 by Porsha Quiles RN  Outcome: Adequate for Discharge    Goal: Maintains/Returns to pre admission functional level  Description: INTERVENTIONS:  - Perform AM-PAC 6 Click Basic Mobility/ Daily Activity assessment daily.  - Set and communicate daily mobility goal to care team and patient/family/caregiver.   - Collaborate with rehabilitation services on mobility goals if consulted  - Perform Range of Motion 3 times a day.  - Reposition patient every 2 hours.  - Dangle patient 3 times a day  - Stand patient 3 times a day  - Ambulate patient 3 times a day  - Out of bed to chair 3 times a day   - Out of bed for meals 3 times a day  - Out of bed for toileting  - Record patient progress and toleration of activity level   11/4/2024 1456 by Porsha Quiles RN  Outcome: Adequate for Discharge       Problem: DISCHARGE PLANNING  Goal: Discharge to home or other facility with appropriate resources  Description: INTERVENTIONS:  - Identify barriers to discharge w/patient and caregiver  - Arrange for needed discharge  resources and transportation as appropriate  - Identify discharge learning needs (meds, wound care, etc.)  - Arrange for interpretive services to assist at discharge as needed  - Refer to Case Management Department for coordinating discharge planning if the patient needs post-hospital services based on physician/advanced practitioner order or complex needs related to functional status, cognitive ability, or social support system  11/4/2024 1456 by Porsha Quiles RN  Outcome: Adequate for Discharge

## 2024-11-04 NOTE — PLAN OF CARE
Problem: Potential for Falls  Goal: Patient will remain free of falls  Description: INTERVENTIONS:  - Educate patient/family on patient safety including physical limitations  - Instruct patient to call for assistance with activity   - Consult OT/PT to assist with strengthening/mobility   - Keep Call bell within reach  - Keep bed low and locked with side rails adjusted as appropriate  - Keep care items and personal belongings within reach  - Initiate and maintain comfort rounds  - Make Fall Risk Sign visible to staff  - Offer Toileting every 2 Hours, in advance of need  - Initiate/Maintain n/a alarm  - Obtain necessary fall risk management equipment:   - Apply yellow socks and bracelet for high fall risk patients  - Consider moving patient to room near nurses station  Outcome: Progressing     Problem: Prexisting or High Potential for Compromised Skin Integrity  Goal: Skin integrity is maintained or improved  Description: INTERVENTIONS:  - Identify patients at risk for skin breakdown  - Assess and monitor skin integrity  - Assess and monitor nutrition and hydration status  - Monitor labs   - Assess for incontinence   - Turn and reposition patient  - Assist with mobility/ambulation  - Relieve pressure over bony prominences  - Avoid friction and shearing  - Provide appropriate hygiene as needed including keeping skin clean and dry  - Evaluate need for skin moisturizer/barrier cream  - Collaborate with interdisciplinary team   - Patient/family teaching  - Consider wound care consult   Outcome: Progressing     Problem: PAIN - ADULT  Goal: Verbalizes/displays adequate comfort level or baseline comfort level  Description: Interventions:  - Encourage patient to monitor pain and request assistance  - Assess pain using appropriate pain scale  - Administer analgesics based on type and severity of pain and evaluate response  - Implement non-pharmacological measures as appropriate and evaluate response  - Consider cultural and  social influences on pain and pain management  - Notify physician/advanced practitioner if interventions unsuccessful or patient reports new pain  Outcome: Progressing     Problem: INFECTION - ADULT  Goal: Absence or prevention of progression during hospitalization  Description: INTERVENTIONS:  - Assess and monitor for signs and symptoms of infection  - Monitor lab/diagnostic results  - Monitor all insertion sites, i.e. indwelling lines, tubes, and drains  - Monitor endotracheal if appropriate and nasal secretions for changes in amount and color  - [place] appropriate cooling/warming therapies per order  - Administer medications as ordered  - Instruct and encourage patient and family to use good hand hygiene technique  - Identify and instruct in appropriate isolation precautions for identified infection/condition  Outcome: Progressing  Goal: Absence of fever/infection during neutropenic period  Description: INTERVENTIONS:  - Monitor WBC    Outcome: Progressing     Problem: SAFETY ADULT  Goal: Patient will remain free of falls  Description: INTERVENTIONS:  - Educate patient/family on patient safety including physical limitations  - Instruct patient to call for assistance with activity   - Consult OT/PT to assist with strengthening/mobility   - Keep Call bell within reach  - Keep bed low and locked with side rails adjusted as appropriate  - Keep care items and personal belongings within reach  - Initiate and maintain comfort rounds  - Make Fall Risk Sign visible to staff  - Offer Toileting every 2 Hours, in advance of need  - Initiate/Maintain n/a alarm  - Obtain necessary fall risk management equipment:   - Apply yellow socks and bracelet for high fall risk patients  - Consider moving patient to room near nurses station  Outcome: Progressing  Goal: Maintain or return to baseline ADL function  Description: INTERVENTIONS:  -  Assess patient's ability to carry out ADLs; assess patient's baseline for ADL function and  identify physical deficits which impact ability to perform ADLs (bathing, care of mouth/teeth, toileting, grooming, dressing, etc.)  - Assess/evaluate cause of self-care deficits   - Assess range of motion  - Assess patient's mobility; develop plan if impaired  - Assess patient's need for assistive devices and provide as appropriate  - Encourage maximum independence but intervene and supervise when necessary  - Involve family in performance of ADLs  - Assess for home care needs following discharge   - Consider OT consult to assist with ADL evaluation and planning for discharge  - Provide patient education as appropriate  Outcome: Progressing  Goal: Maintains/Returns to pre admission functional level  Description: INTERVENTIONS:  - Perform AM-PAC 6 Click Basic Mobility/ Daily Activity assessment daily.  - Set and communicate daily mobility goal to care team and patient/family/caregiver.   - Collaborate with rehabilitation services on mobility goals if consulted  - Perform Range of Motion 3 times a day.  - Reposition patient every 2 hours.  - Dangle patient 3 times a day  - Stand patient 3 times a day  - Ambulate patient 3 times a day  - Out of bed to chair 3 times a day   - Out of bed for meals 3 times a day  - Out of bed for toileting  - Record patient progress and toleration of activity level   Outcome: Progressing     Problem: DISCHARGE PLANNING  Goal: Discharge to home or other facility with appropriate resources  Description: INTERVENTIONS:  - Identify barriers to discharge w/patient and caregiver  - Arrange for needed discharge resources and transportation as appropriate  - Identify discharge learning needs (meds, wound care, etc.)  - Arrange for interpretive services to assist at discharge as needed  - Refer to Case Management Department for coordinating discharge planning if the patient needs post-hospital services based on physician/advanced practitioner order or complex needs related to functional status,  cognitive ability, or social support system  Outcome: Progressing     Problem: Knowledge Deficit  Goal: Patient/family/caregiver demonstrates understanding of disease process, treatment plan, medications, and discharge instructions  Description: Complete learning assessment and assess knowledge base.  Interventions:  - Provide teaching at level of understanding  - Provide teaching via preferred learning methods  Outcome: Progressing

## 2024-11-04 NOTE — ASSESSMENT & PLAN NOTE
"LBP w/ LLE radiculopathy   Presented to the ER w/ severe LBP w/ radiation to the LE in an S1 distribution w/ pain limited weakness and trouble walking   Has a hx of LBP w/ intermittent flares of sciatica, improved with steroids in the past   Following with PSP who he saw on 10/31 and underwent trigger point injection and was prescribed a PO steroids and a short course of tramadol without relief   MRI L-spine reveals a L sided L5-S1 disc herniation   Pt admitted for pain control and recommended a trial of conservative management   Upon reassessment today, pt with significantly improved pain and mobility. Pt is hoping for d/c home and to avoid any surgeries at this time   Denies any weakness, numbness, BBI, urinary retention, saddle anesthesia, inability to ambulate  No prior lumbar surgeries      Imagin2024 MRI L-spine: mild motion artifact is present.L5-S1: Left subarticular disc extrusion compresses left S1 descending nerve root resulting in mild canal stenosis. Consider consultation with spine surgery.Multilevel degenerative changes of lumbar spine with varying degrees of canal stenosis (mild L5-S1) and foraminal narrowing (severe left L5-S1; moderate right L5-S1), as detailed above.     Plan:   Continue to closely monitor neuro exam   Frequent neuro checks per primary team   Maintain normotensive BP goals, SBP < 160, MAP > 65   No acute neuro surgical intervention indicated at this time   Case and imaging reviewed this am on rounds w/ Dr. Blanca   MRI L-spine reveals a left-sided L5-S1 disc herniation that does explain the patient's presenting symptoms  Thankfully patient remains neurologically intact with significantly improved pain/presenting symptoms  Denies any \"red flag\" signs/symptoms  Recommend ongoing conservative management  Continue current multimodal pain medication regimen  Recommend outpatient referral to PT  Recommend outpatient referral to [facility] and pain management  Strongly " consider outpatient lumbar SHEY at L5-S1 on the left at pain management provider discretion  Will plan to follow with the patient again in approximately 4 weeks in the office with Dr. Rianna cabrera to assess his progress with conservative management.  Should patient continue to fail these measures or have significant/worsening pain, can consider elective neurosurgical intervention at that time.  DVT ppx: SCDs, SQ lovenox   Medical management per primary team   Pain control per primary team   PT/OT   Social work following for assistance with dispo once medically cleared     Neurosurgery will sign off at this time.  Will plan to follow the patient as an outpatient in clinic. Please reach out with any further questions or concerns.

## 2024-11-04 NOTE — CONSULTS
Consultation - Acute Pain   Name: Ihsan Gomez 49 y.o. male I MRN: [id number]  Unit/Bed#: -01 I Date of Admission: 11/1/2024   Date of Service: 11/4/2024 I Hospital Day: 3       Inpatient consult to Acute Pain Service     Date/Time  11/4/2024 10:01 AM     Performed by  Frederick Garza DO   Authorized by  Prerna Duckworth MD           Physician Requesting Evaluation: Prerna Duckworth MD   Reason for Evaluation / Principal Problem: Lumbar Radiculopathy     Assessment & Plan  Lumbar radiculopathy    - Low back pain radiating into left posterior leg down to the calf.     Imaging:     MRI lumbar spine wo 11/2/24: Mild motion artifact is present.L5-S1: Left subarticular disc extrusion compresses left S1 descending nerve root resulting in mild canal stenosis. Consider consultation with spine surgery.Multilevel degenerative changes of lumbar spine with varying degrees of canal stenosis (mild L5-S1) and foraminal narrowing (severe left L5-S1; moderate right L5-S1).    - Current regiment as below :      - Tylenol 975 Q8    - Gabapentin 100mg BUD   - Lidocaine patch   - Robaxin 1000 mg q8    - Medrol dose taper    - Dilaudid decrease to 0.5 mg IV Q4 for breakthrough pain     - Increase P.O Dilaudid to 4/6 mg A4 for moderate to severe pain.     APS will continue to follow. Please contact Acute Pain Service - via SecureChat from [phone] with additional questions or concerns. See SecureChat or U[employer] for additional contacts and after hours information.     History of Present Illness    HPI: Ihsan Gomez is a 49 y.o. year old male PMHx of type 2 diabetes mellitus, hyperlipidemia, cutaneous T-cell lymphoma ,bicuspid aortic valve status post repair and aortic arch repair who presents with worsening severe low back pain radiated to bilateral thigh on 11/1. Symptoms started 5 days ago, no fall or trauma, worse with sitting and standing .Patient denied any bladder/bowel dysfunction. MRI lumbar spine showed Left subarticular  disc extrusion compresses left S1 descending nerve root resulting in mild canal stenosis. Patient was seen by neurosurgery and no neurosurgical intervention was warranted.     Current pain location(s): Pain Score: 7  Pain Location/Orientation: Orientation: Left, Location: Leg  Pain Scale: Pain Assessment Tool: 0-10  Current Analgesic regimen:       - Tylenol 975 Q8    - Gabapentin 100mg BUD   - Lidocaine patch   - Robaxin 1000 mg q8    - Medrol dose taper    - Dilaudid decrease to 0.5 mg IV Q4 for breakthrough pain     - Increase P.O Dilaudid to 4/6 mg Q4 for moderate to severe pain.     Pain History: He states most of the pain is low back that is radicular in nature down the left lower extremity down to the calf.     Review of Systems  I have reviewed the patient's PMH, PSH, Social History, Family History, Meds, and Allergies    Objective :  Temp:  [98.1 °F (36.7 °C)-98.4 °F (36.9 °C)] 98.4 °F (36.9 °C)  HR:  [69-73] 69  BP: (121-128)/(64-76) 124/76  Resp:  [16] 16  SpO2:  [91 %-94 %] 94 %    Physical Exam  Constitutional:       General: He is not in acute distress.     Appearance: He is not ill-appearing.   HENT:      Head: Normocephalic and atraumatic.      Right Ear: There is no impacted cerumen.      Nose: Nose normal.   Eyes:      General: No scleral icterus.  Cardiovascular:      Rate and Rhythm: Normal rate and regular rhythm.      Heart sounds: No murmur heard.  Pulmonary:      Effort: Pulmonary effort is normal. No respiratory distress.   Abdominal:      General: There is no distension.      Tenderness: There is no abdominal tenderness.   Musculoskeletal:         General: No swelling.   Skin:     Coloration: Skin is not jaundiced.   Neurological:      Mental Status: He is alert. Mental status is at baseline.       Lab Results: I have reviewed the following results:  Estimated Creatinine Clearance: 157.3 mL/min (by C-G formula based on SCr of 0.66 mg/dL).  Lab Results   Component Value Date    WBC 7.18  11/01/2024    WBC 10.96 (H) 09/12/2014    HGB 13.6 11/01/2024    HGB 10.8 (L) 09/12/2014    HCT 39.8 11/01/2024    HCT 33.6 (L) 09/12/2014     11/01/2024     09/12/2014         Component Value Date/Time     09/12/2014 2311    K 4.1 11/01/2024 0912    K 4.3 04/24/2024 0644     11/01/2024 0912     04/24/2024 0644    CO2 25 11/01/2024 0912    CO2 25 04/24/2024 0644    BUN 20 11/01/2024 0912    BUN 18 04/24/2024 0644    CREATININE 0.66 11/01/2024 0912    CREATININE 0.63 04/24/2024 0644         Component Value Date/Time    CALCIUM 8.9 11/01/2024 0912    CALCIUM 9.7 04/24/2024 0644    ALKPHOS 68 11/01/2024 0912    ALKPHOS 58 04/24/2024 0644    AST 13 11/01/2024 0912    AST 16 04/24/2024 0644    ALT 19 11/01/2024 0912    ALT 22 04/24/2024 0644    BILITOT 0.4 09/12/2014 2311    TP 7.0 11/01/2024 0912    TP 7.6 04/24/2024 0644    ALB 4.3 11/01/2024 0912    ALB 4.8 04/24/2024 0644

## 2024-11-04 NOTE — PLAN OF CARE
Problem: PHYSICAL THERAPY ADULT  Goal: Performs mobility at highest level of function for planned discharge setting.  See evaluation for individualized goals.  Description: Treatment/Interventions: Functional transfer training, LE strengthening/ROM, Elevations, Therapeutic exercise, Endurance training, Patient/family training, Equipment eval/education, Bed mobility, Gait training, Compensatory technique education, Spoke to nursing, OT, Family  Equipment Recommended: Walker       See flowsheet documentation for full assessment, interventions and recommendations.  Outcome: Adequate for Discharge  Note: Prognosis: Excellent  Problem List: Decreased endurance, Impaired balance, Decreased range of motion, Decreased mobility, Pain  Assessment: pt see for session as above. pt w/ significant inprovement in LBP and radicular sx from yesterday. pt able to mobilize w/ RW and S; negotiate steps w/ some incraesed in radicular sx in LE. resolved w/ prone positioning. Pt eager to go home w/ family. given pt's significnat progress and pain controlled. PT clearing pt for home w/ RW and OPPT referral . Pt pleased w/ progress and thankful for education on positioning this session.  Barriers to Discharge: Inaccessible home environment, Decreased caregiver support     Rehab Resource Intensity Level, PT: (S) III (Minimum Resource Intensity) (home w/ OPPT/ RW)    See flowsheet documentation for full assessment.

## 2024-11-04 NOTE — CASE MANAGEMENT
Case Management Discharge Planning Note    Patient name Ihsan Gomez  Location /-01 MRN [id number]  : 1975 Date 2024       Current Admission Date: 2024  Current Admission Diagnosis:Lumbar radiculopathy   Patient Active Problem List    Diagnosis Date Noted Date Diagnosed    Low back pain 2024     Lumbar radiculopathy 2024     Diabetes mellitus type 2, controlled (HCC) 2024     Dyslipidemia 2024       LOS (days): 3  Geometric Mean LOS (GMLOS) (days): 2.9  Days to GMLOS:-0.1     OBJECTIVE:  Risk of Unplanned Readmission Score: 7.14         Current admission status: Inpatient   Preferred Pharmacy:   [facility]/pharmacy #[id number] [facility] [place] - [id number] [facility] [place] [address]  Phone: [phone] Fax: [phone]    [facility] [place] - [address]  [address]  [place] [address]  Phone: [phone] Fax: [phone]    Primary Care Provider: Ankit Galeano MD    Primary Insurance: [employer]ET[employer]  Secondary Insurance:     DISCHARGE DETAILS:          Other Referral/Resources/Interventions Provided:  Referral Comments: Per chart review: therapy recommending level I resources. Per am rounds: pt would like to go home.  Therapy to re-eval pt.  CM met w/pt at bedside. Pt sitting EOB. Per pt this was the first time he was able to sit up OOB.  Pt stated he was doing much better and would like to go home.  Explained therapy will work w/pt to re-eval.  Notified by PT - pt clear for home w/OP PT when medically stable.  No CM needs noted.

## 2024-11-05 ENCOUNTER — TELEPHONE (OUTPATIENT)
Age: 49
End: 2024-11-05

## 2024-11-05 NOTE — ASSESSMENT & PLAN NOTE
Lab Results   Component Value Date    HGBA1C 7.3 (H) 11/01/2024       Recent Labs     11/03/24  1601 11/03/24  2009 11/04/24  0535 11/04/24  1037   POCGLU 190* 171* 136 200*       Blood Sugar Average: Last 72 hrs:  (P) 159.4  With outpatient regimen.  Follow-up with PCP for further management

## 2024-11-05 NOTE — UTILIZATION REVIEW
NOTIFICATION OF ADMISSION DISCHARGE   This is a Notification of Discharge from [facility]. Please be advised that this patient has been discharge from our facility. Below you will find the admission and discharge date and time including the patient’s disposition.   UTILIZATION REVIEW CONTACT:  Delisa Hernandez  Utilization   Network Utilization Review Department  Phone: [phone] x carefully listen to the prompts. All voicemails are confidential.  Email: [email or web address]     ADMISSION INFORMATION  PRESENTATION DATE: 11/1/2024  8:33 AM  OBERVATION ADMISSION DATE: N/A  INPATIENT ADMISSION DATE: 11/1/24 12:06 PM   DISCHARGE DATE: 11/4/2024  2:59 PM   DISPOSITION:Home/Self Care    Network Utilization Review Department  ATTENTION: Please call with any questions or concerns to [phone] and carefully listen to the prompts so that you are directed to the right person. All voicemails are confidential.   For Discharge needs, contact Care Management DC Support Team at [phone] opt. 2  Send all requests for admission clinical reviews, approved or denied determinations and any other requests to dedicated fax number below belonging to the campus where the patient is receiving treatment. List of dedicated fax numbers for the Facilities:  FACILITY NAME UR FAX NUMBER   ADMISSION DENIALS (Administrative/Medical Necessity) [phone]   DISCHARGE SUPPORT TEAM ([facility]) [phone]   PARENT CHILD HEALTH (Maternity/NICU/Pediatrics) [phone]   [facility] [phone]   [facility] [phone]   [facility] [phone]   [facility] [phone]   [facility] [phone]   [facility] [phone]   [facility] [phone]   [facility] [phone]    [facility] [phone]   [facility] [phone]   [facility] [phone]   [facility] [phone]

## 2024-11-05 NOTE — TELEPHONE ENCOUNTER
Caller: Ihsan     Doctor: R     Reason for call: Please mail new patient paperwork to address on file. Thank you     Call back#: [phone]

## 2024-11-05 NOTE — DISCHARGE SUMMARY
Discharge Summary - Hospitalist   Name: Ihsan Gomez 49 y.o. male I MRN: [id number]  Unit/Bed#: MS Pro-01 I Date of Admission: 11/1/2024   Date of Service: 11/5/2024 I Hospital Day: 3     Assessment & Plan  Lumbar radiculopathy  Patient presented with severe back pain and ambulatory dysfunction  No falls or trauma  Pain radiating to bilateral thigh -left more than right  CT scan with Moderate L5-S1 left paracentral disc protrusion abutting and posteriorly displacing the descending left S1 nerve root.  MRI lumbar spine reviewed-L4 S1 left subarticular disc extrusion compresses the left S1 descending nerve root resulting in mild canal stenosis.  Multilevel degenerative changes of the lumbar spine with varying degrees of canal stenosis and foraminal narrowing  Consult neurosurgery-neurosurgery consultation appreciated.  No surgical intervention as per neurosurgery.  PT OT eval-commended outpatient PT OT   Medrol Dosepak.  Pain was better controlled and patient was able to ambulate  Patient was able to be discharged with short-term supply of p.o. Dilaudid.  Patient will be discharged with gabapentin muscle relaxant Tylenol and Lidoderm patch.  Outpatient follow-up with neurosurgery and spine service   Diabetes mellitus type 2, controlled (HCC)  Lab Results   Component Value Date    HGBA1C 7.3 (H) 11/01/2024       Recent Labs     11/03/24  1601 11/03/24 2009 11/04/24  0535 11/04/24  1037   POCGLU 190* 171* 136 200*       Blood Sugar Average: Last 72 hrs:  (P) 159.4  With outpatient regimen.  Follow-up with PCP for further management      Dyslipidemia  Continue with statin  Low back pain       Medical Problems       Resolved Problems  Date Reviewed: 11/1/2024   None       Discharging Physician / Practitioner: Prerna Duckworth MD  PCP: Ankit Galeano MD  Admission Date:   Admission Orders (From admission, onward)       Ordered        11/01/24 1206  Inpatient Admission  Once                          Discharge Date:  7/4/2024    Consultations During Hospital Stay:  Acute pain service  Neurosurgery    Procedures Performed:       Significant Findings / Test Results:   MRI lumbar spine-L5-S1-left subarticular disc extrusion compresses the left S1 descending nerve root resulting in mild canal stenosis.  Multilevel degenerative changes of the lumbar spine with varying degrees of canal stenosis and foraminal narrowing.  Lower extremity venous Doppler.-No evidence of acute or chronic DVT  CTA dissection protocol-stable postoperative changes reflecting ascending aortic hemiarch graft repair without evidence of thoracic or abdominal dissection intramural hematoma aneurysm or stenosis.  Moderate L5-S1 left paracentral disc protrusion abutting and posteriorly displacing the descending left S1 nerve root.    Incidental Findings:          Test Results Pending at Discharge (will require follow up):        Outpatient Tests Requested:      Complications:   none    Reason for Admission: Back pain    Hospital Course:   Ihsan Gomez is a 49 y.o. male patient who originally presented to the hospital on 11/1/2024 due to intractable back pain.  Patient symptoms started 5 days ago no traumatic injury notified.  Initial CAT scan was concerning for moderate L5-S1 left paracentral disc protrusion with involvement of the left S1 nerve root.  Patient was admitted to the hospital.  Started on gabapentin and muscle relaxant around-the-clock Tylenol and oxycodone with as needed IV Dilaudid.  Neurosurgery evaluated the patient.  MRI obtained.  Pain improved with pain medication.  Patient reported that oxycodone was not helping him so he changed to Dilaudid with improvement in his pain.  Patient worked with PT OT and his pain is significantly better and he was able to ambulate.  APS also evaluated the patient.  Patient will be discharged home with outpatient follow-up with neurosurgery and spine clinic.  Patient was able to ambulate before discharge.  Arrange  "for outpatient PT OT.  For details refer to the chart          Please see above list of diagnoses and related plan for additional information.     Condition at Discharge: good    Discharge Day Visit / Exam:   Subjective: Patient seen and examined.  Patient reported that his pain is much better controlled.  Able to ambulate.  Patient is eager to go home  Vitals: Blood Pressure: 124/76 (11/04/24 0733)  Pulse: 69 (11/04/24 0733)  Temperature: 98.4 °F (36.9 °C) (11/04/24 0733)  Temp Source: Oral (11/01/24 1733)  Respirations: 16 (11/03/24 2225)  Height: 5' 9\" (175.3 cm) (11/01/24 1733)  Weight - Scale: 104 kg (228 lb 2.8 oz) (11/01/24 1733)  SpO2: 94 % (11/04/24 0733)  Physical Exam  Constitutional:       General: He is not in acute distress.     Appearance: He is not ill-appearing.   HENT:      Head: Normocephalic and atraumatic.      Nose: Nose normal.   Eyes:      General: No scleral icterus.  Cardiovascular:      Rate and Rhythm: Normal rate and regular rhythm.      Heart sounds: No murmur heard.  Pulmonary:      Effort: Pulmonary effort is normal. No respiratory distress.   Abdominal:      General: There is no distension.   Musculoskeletal:         General: No swelling. Normal range of motion.   Skin:     General: Skin is warm.   Neurological:      Mental Status: He is alert. Mental status is at baseline.          Discussion with Family:  Updated patient in detail.     Discharge instructions/Information to patient and family:   See after visit summary for information provided to patient and family.      Provisions for Follow-Up Care:  See after visit summary for information related to follow-up care and any pertinent home health orders.      Mobility at time of Discharge:   Basic Mobility Inpatient Raw Score: 22  JH-HLM Goal: 7: Walk 25 feet or more  JH-HLM Achieved: 7: Walk 25 feet or more  HLM Goal achieved. Continue to encourage appropriate mobility.     Disposition:   Home    Planned Readmission: " none    Discharge Medications:  See after visit summary for reconciled discharge medications provided to patient and/or family.      Administrative Statements   Discharge Statement:  I have spent a total time of 45 minutes in caring for this patient on the day of the visit/encounter. >30 minutes of time was spent on: Counseling / Coordination of care.    **Please Note: This note may have been constructed using a voice recognition system**

## 2024-11-05 NOTE — ASSESSMENT & PLAN NOTE
Patient presented with severe back pain and ambulatory dysfunction  No falls or trauma  Pain radiating to bilateral thigh -left more than right  CT scan with Moderate L5-S1 left paracentral disc protrusion abutting and posteriorly displacing the descending left S1 nerve root.  MRI lumbar spine reviewed-L4 S1 left subarticular disc extrusion compresses the left S1 descending nerve root resulting in mild canal stenosis.  Multilevel degenerative changes of the lumbar spine with varying degrees of canal stenosis and foraminal narrowing  Consult neurosurgery-neurosurgery consultation appreciated.  No surgical intervention as per neurosurgery.  PT OT eval-commended outpatient PT OT   Medrol Dosepak.  Pain was better controlled and patient was able to ambulate  Patient was able to be discharged with short-term supply of p.o. Dilaudid.  Patient will be discharged with gabapentin muscle relaxant Tylenol and Lidoderm patch.  Outpatient follow-up with neurosurgery and spine service

## 2024-11-18 ENCOUNTER — TELEPHONE (OUTPATIENT)
Age: 49
End: 2024-11-18

## 2024-11-21 ENCOUNTER — EVALUATION (OUTPATIENT)
Dept: PHYSICAL THERAPY | Facility: REHABILITATION | Age: 49
End: 2024-11-21
Payer: COMMERCIAL

## 2024-11-21 DIAGNOSIS — M54.42 ACUTE LEFT-SIDED LOW BACK PAIN WITH LEFT-SIDED SCIATICA: Primary | ICD-10-CM

## 2024-11-21 DIAGNOSIS — M51.27 LUMBOSACRAL DISC HERNIATION: ICD-10-CM

## 2024-11-21 PROCEDURE — 97110 THERAPEUTIC EXERCISES: CPT | Performed by: PHYSICAL THERAPIST

## 2024-11-21 PROCEDURE — 97163 PT EVAL HIGH COMPLEX 45 MIN: CPT | Performed by: PHYSICAL THERAPIST

## 2024-11-21 NOTE — LETTER
2024    [facility]  [address] [address]    Patient: Ihsan Gomez   YOB: 1975   Date of Visit: 2024     Encounter Diagnosis     ICD-10-CM    1. Acute left-sided low back pain with left-sided sciatica  M54.42           Dear Dr. Church:    Thank you for your recent referral of Ihsan Gomez. Please review the attached evaluation summary from Ihsan's recent visit.     Please verify that you agree with the plan of care by signing the attached order.     If you have any questions or concerns, please do not hesitate to call.     I sincerely appreciate the opportunity to share in the care of one of your patients and hope to have another opportunity to work with you in the near future.       Sincerely,    Eddi Bah, PT      Referring Provider:      I certify that I have read the below Plan of Care and certify the need for these services furnished under this plan of treatment while under my care.                    [facility]  [address] [place] [address]  Via Fax: [phone]          PT Evaluation     Today's date: 2024  Patient name: Ihsan Gomez  : 1975  MRN: [id number]  Referring provider: Luis Church  Dx:   Encounter Diagnosis     ICD-10-CM    1. Acute left-sided low back pain with left-sided sciatica  M54.42           Start Time: 1655  Stop Time: 1740  Total time in clinic (min): 45 minutes    Assessment  Impairments: abnormal muscle firing, abnormal muscle tone, abnormal or restricted ROM, abnormal movement, activity intolerance, impaired physical strength and pain with function    Assessment details: Ihsan Gomez is a 49 y.o. male presenting to outpatient physical therapy on 24 with referral from MD for acute L side LBP with radicular pain. MSIS consistent with LS derangement with DP preliminarily for ext. Pain neuropathic in nature. Upon evaluation, Ihsan demonstrates impaired LS AROM,  + ND testing and pain with resulting functional loss. The listed impairments and functional limitation are effecting Ihsan ability to function at prior level. They can continue to benefit from physical therapy services at this times in order to address the above discussed impairments and functional limitation in order to allow for a return to premorbid status.    HEP: REIL with pt and OP and OP with belt.    Patient left reports 1/10 pain in posterior thigh. Educated on flexion avoidance at this time, especially for prolonged periods. Will f/u via phone call in 24 hours.     Understanding of Dx/Px/POC: good     Prognosis: good    Plan  Patient would benefit from: skilled PT  Planned modality interventions: thermotherapy: hydrocollator packs    Planned therapy interventions: joint mobilization, manual therapy, ADL training, neuromuscular re-education, home exercise program, therapeutic exercise, therapeutic activities, strengthening, patient education and functional ROM exercises    Frequency: 2x week  Duration in weeks: 8  Treatment plan discussed with: patient        Subjective Evaluation    History of Present Illness  Mechanism of injury: Ihsan is a 49 y.o. male presenting to physical therapy on 11/21/24 with referral from MD for acute on chronic left sided LBP with radiating pain. 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 He states that he denies a specific ROWDY. He reports that he was at work in the morning when walking when he noticed a tightening of his low back. He states that his pain began I his lower back and worked his way into his leg, calf and foot. His CC is is buttock and posterior thigh pain at this time. He will get some       Patient symptoms are constant but will fluctuate. Pain will increase at random, sometimes when lying, sitting. Better standing and walking. Feels best when reclined.  Pain is improving    Patient denies bowel and bladder changes (denies urinary  retention)/saddle numbness  (-) pain with cough/sneeze    Patient Goals  Patient goals for therapy: decreased pain and return to work    Pain  Current pain rating: 3  At worst pain ratin  Location: see above but currenty primarliy thigh posterior and buttock  Quality: sharp      Diagnostic Tests  MRI studies: abnormal  Treatments  Previous treatment: medication    Short Term Goals:   1. Patient will be Independent with hep  2. Patient will improve pain with activity by 50%  3. Patient will report GROC 50% or greater      Long Term Goals:   1. Patient will improve FOTO to greater then goal  2. Patient will improve pain with activity to 2/10 or less  3. Patient will continue with HEP independence to allow for decreased future reoccurrence of pain and loss in function  4. Patient will report GROC 75% or greater  5. Patient will have normalized LLE myotomes  6. Patient will have pain free LS AROM performed multiple reps into each dir.  7. Patient will return to all work activity with lift pfree      Objective    Posture: neutral/flat LS    Myotomes (L/R): decreased L5/S1 (ankle eversion)  Dermatome: (pinprick- L/R):  Decreased L5 (lateral shin more proximal to knee)     Reflexes:  (L/R) L3-4:   2+ bl     S1:     2+ bl          Clonus= neg    GAIT: decreased nicola    Lumbar  % of normal   Flex. NT   Extn. 50   SG Left 75   SG Right 75   ROT Left 75   ROT Right 75   Repeated Movements  Standing:  extension= decreased, NB  Ext + table OP = increased, W    Lying:   EIL: NE,NE  REP EIL, dereased, NB  REIL with pt OP: decreased B but returns within 1 min  REIL with PT OP: decreased, B and remain better        MMT         AROM          PROM    Hip       L       R        L           R      L     R   Flex.         Extn.         Abd.         Add.         IR.         ER.                  G. Max         G. Med.         Iliop.             Neuro Dynamic Testing:  Slump test: L= + ~ 40 deg knee ext    R=   neg    Straight leg  "raise:   L= + 30 deg     R=   neg                    Crossed Straight leg raise:   L=  neg  R=    neg                        Precautions: CA      Manuals 11/21                                                                Neuro Re-Ed                                                                                                        Ther Ex 11/21            EIL 30\"x2            REIL 2x10            REIL + pt. OP 2x5 at end of REIL            REIL + PT OP 10x            Education time 5'                                                   Ther Activity                                       Gait Training                                       Modalities                                                            "

## 2024-11-21 NOTE — PROGRESS NOTES
PT Evaluation     Today's date: 2024  Patient name: Ihsan Gomez  : 1975  MRN: [id number]  Referring provider: Luis Church  Dx:   Encounter Diagnosis     ICD-10-CM    1. Acute left-sided low back pain with left-sided sciatica  M54.42           Start Time: 1655  Stop Time: 1740  Total time in clinic (min): 45 minutes    Assessment  Impairments: abnormal muscle firing, abnormal muscle tone, abnormal or restricted ROM, abnormal movement, activity intolerance, impaired physical strength and pain with function    Assessment details: Ihsan Gomez is a 49 y.o. male presenting to outpatient physical therapy on 24 with referral from MD for acute L side LBP with radicular pain. MSIS consistent with LS derangement with DP preliminarily for ext. Pain neuropathic in nature. Upon evaluation, Ihsan demonstrates impaired LS AROM, + ND testing and pain with resulting functional loss. The listed impairments and functional limitation are effecting Ihsan ability to function at prior level. They can continue to benefit from physical therapy services at this times in order to address the above discussed impairments and functional limitation in order to allow for a return to premorbid status.    HEP: REIL with pt and OP and OP with belt.    Patient left reports 1/10 pain in posterior thigh. Educated on flexion avoidance at this time, especially for prolonged periods. Will f/u via phone call in 24 hours.     Understanding of Dx/Px/POC: good     Prognosis: good    Plan  Patient would benefit from: skilled PT  Planned modality interventions: thermotherapy: hydrocollator packs    Planned therapy interventions: joint mobilization, manual therapy, ADL training, neuromuscular re-education, home exercise program, therapeutic exercise, therapeutic activities, strengthening, patient education and functional ROM exercises    Frequency: 2x week  Duration in weeks: 8  Treatment plan discussed with:  patient        Subjective Evaluation    History of Present Illness  Mechanism of injury: Ihsan is a 49 y.o. male presenting to physical therapy on 24 with referral from MD for acute on chronic left sided LBP with radiating pain. 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 He states that he denies a specific ROWDY. He reports that he was at work in the morning when walking when he noticed a tightening of his low back. He states that his pain began I his lower back and worked his way into his leg, calf and foot. His CC is is buttock and posterior thigh pain at this time. He will get some       Patient symptoms are constant but will fluctuate. Pain will increase at random, sometimes when lying, sitting. Better standing and walking. Feels best when reclined.  Pain is improving    Patient denies bowel and bladder changes (denies urinary retention)/saddle numbness  (-) pain with cough/sneeze    Patient Goals  Patient goals for therapy: decreased pain and return to work    Pain  Current pain rating: 3  At worst pain ratin  Location: see above but currenty primarliy thigh posterior and buttock  Quality: sharp      Diagnostic Tests  MRI studies: abnormal  Treatments  Previous treatment: medication    Short Term Goals:   1. Patient will be Independent with hep  2. Patient will improve pain with activity by 50%  3. Patient will report GROC 50% or greater      Long Term Goals:   1. Patient will improve FOTO to greater then goal  2. Patient will improve pain with activity to 2/10 or less  3. Patient will continue with HEP independence to allow for decreased future reoccurrence of pain and loss in function  4. Patient will report GROC 75% or greater  5. Patient will have normalized LLE myotomes  6. Patient will have pain free LS AROM performed multiple reps into each dir.  7. Patient will return to all work activity with lift pfree      Objective    Posture: neutral/flat LS    Myotomes (L/R):  "decreased L5/S1 (ankle eversion)  Dermatome: (pinprick- L/R):  Decreased L5 (lateral shin more proximal to knee)     Reflexes:  (L/R) L3-4:   2+ bl     S1:     2+ bl          Clonus= neg    GAIT: decreased nicola    Lumbar  % of normal   Flex. NT   Extn. 50   SG Left 75   SG Right 75   ROT Left 75   ROT Right 75   Repeated Movements  Standing:  extension= decreased, NB  Ext + table OP = increased, W    Lying:   EIL: NE,NE  REP EIL, dereased, NB  REIL with pt OP: decreased B but returns within 1 min  REIL with PT OP: decreased, B and remain better        MMT         AROM          PROM    Hip       L       R        L           R      L     R   Flex.         Extn.         Abd.         Add.         IR.         ER.                  G. Max         G. Med.         Iliop.             Neuro Dynamic Testing:  Slump test: L= + ~ 40 deg knee ext    R=   neg    Straight leg raise:   L= + 30 deg     R=   neg                    Crossed Straight leg raise:   L=  neg  R=    neg                        Precautions: CA      Manuals 11/21                                                                Neuro Re-Ed                                                                                                        Ther Ex 11/21            EIL 30\"x2            REIL 2x10            REIL + pt. OP 2x5 at end of REIL            REIL + PT OP 10x            Education time 5'                                                   Ther Activity                                       Gait Training                                       Modalities                                            "

## 2024-11-25 ENCOUNTER — OFFICE VISIT (OUTPATIENT)
Dept: PHYSICAL THERAPY | Facility: REHABILITATION | Age: 49
End: 2024-11-25
Payer: COMMERCIAL

## 2024-11-25 DIAGNOSIS — M51.27 LUMBOSACRAL DISC HERNIATION: ICD-10-CM

## 2024-11-25 DIAGNOSIS — M54.42 ACUTE LEFT-SIDED LOW BACK PAIN WITH LEFT-SIDED SCIATICA: Primary | ICD-10-CM

## 2024-11-25 PROCEDURE — 97530 THERAPEUTIC ACTIVITIES: CPT | Performed by: PHYSICAL THERAPIST

## 2024-11-25 PROCEDURE — 97110 THERAPEUTIC EXERCISES: CPT | Performed by: PHYSICAL THERAPIST

## 2024-11-25 NOTE — PROGRESS NOTES
"Daily Note     Today's date: 2024  Patient name: Ihsan Gomez  : 1975  MRN: [id number]  Referring provider: Luis Church  Dx:   Encounter Diagnosis     ICD-10-CM    1. Acute left-sided low back pain with left-sided sciatica  M54.42       2. Lumbosacral disc herniation  M51.27                      Subjective: Drastically improved. Baseline symptoms today only tension in lower back. LLE symptoms present in the morning and improve w/ exercise and as he gets moving.     Objective: See treatment diary below    RFIS - P, Peripheralized ( posterior L thigh to knee)  REIL - D, A  ROXANA - D, A    Assessment: ROXANA completed after peripherlization of symptoms w/ RFIS and yielded above listed results. Given his symptom response to RFIS, recovery of function not initiated today. Would benefit from continued skilled PT.     Plan: Continue per plan of care.      Precautions: CA      Manuals                                                                Neuro Re-Ed                                                                                                        Ther Ex            EIL 30\"x2            REIL 2x10 2x10            REIL + pt. OP 2x5 at end of REIL            REIL + PT OP 10x            Education time 5'            ROXANA  3x10                        RFIS  5x           Ther Activity             Education  Regarding centralization, recovery of function 10'                        Gait Training                                       Modalities                                            "

## 2024-11-26 ENCOUNTER — APPOINTMENT (OUTPATIENT)
Dept: PHYSICAL THERAPY | Facility: REHABILITATION | Age: 49
End: 2024-11-26
Payer: COMMERCIAL

## 2024-11-27 ENCOUNTER — OFFICE VISIT (OUTPATIENT)
Dept: PHYSICAL THERAPY | Facility: REHABILITATION | Age: 49
End: 2024-11-27
Payer: COMMERCIAL

## 2024-11-27 DIAGNOSIS — M51.27 LUMBOSACRAL DISC HERNIATION: ICD-10-CM

## 2024-11-27 DIAGNOSIS — M54.42 ACUTE LEFT-SIDED LOW BACK PAIN WITH LEFT-SIDED SCIATICA: Primary | ICD-10-CM

## 2024-11-27 PROCEDURE — 97110 THERAPEUTIC EXERCISES: CPT | Performed by: PHYSICAL THERAPIST

## 2024-11-27 NOTE — PROGRESS NOTES
"Daily Note     Today's date: 2024  Patient name: Ihsan Gomez  : 1975  MRN: [id number]  Referring provider: Luis Church  Dx:   Encounter Diagnosis     ICD-10-CM    1. Acute left-sided low back pain with left-sided sciatica  M54.42       2. Lumbosacral disc herniation  M51.27           Start Time: 0700  Stop Time: 0730  Total time in clinic (min): 30 minutes    Subjective: Patient reports that since last session he did notice more consistent pain in his leg. Similar to eval. Continues to HEP. Has not been able to do lumbar ext in lying with belt OP.      Objective: See treatment diary below  REIL PT OP: abolished leg pain, better    Assessment: Tolerated treatment well. Patient with abolished leg pain post session.  Educated on continued EIL with OP and use of LS roll. If no improvement NV will consider sustained ext or lateral component.       Plan: Continue per plan of care.      Precautions: CA      Manuals                                                               Neuro Re-Ed                                                                                                        Ther Ex           assessment   5'          EIL 30\"x2            REIL 2x10 2x10  10x          REIL + pt. OP 2x5 at end of REIL            REIL + PT OP 10x  2x10          Education time 5'            ROXANA  3x10                        RFIS  5x           Ther Activity             Education  Regarding centralization, recovery of function 10'                        Gait Training                                       Modalities                                              "

## 2024-12-04 ENCOUNTER — OFFICE VISIT (OUTPATIENT)
Dept: PHYSICAL THERAPY | Facility: REHABILITATION | Age: 49
End: 2024-12-04
Payer: COMMERCIAL

## 2024-12-04 DIAGNOSIS — M51.27 LUMBOSACRAL DISC HERNIATION: Primary | ICD-10-CM

## 2024-12-04 DIAGNOSIS — M54.42 ACUTE LEFT-SIDED LOW BACK PAIN WITH LEFT-SIDED SCIATICA: ICD-10-CM

## 2024-12-04 PROCEDURE — 97140 MANUAL THERAPY 1/> REGIONS: CPT | Performed by: PHYSICAL THERAPIST

## 2024-12-04 PROCEDURE — 97110 THERAPEUTIC EXERCISES: CPT | Performed by: PHYSICAL THERAPIST

## 2024-12-04 NOTE — PROGRESS NOTES
"Daily Note     Today's date: 2024  Patient name: Ihsan Gomez  : 1975  MRN: [id number]  Referring provider: Luis Church  Dx:   Encounter Diagnosis     ICD-10-CM    1. Lumbosacral disc herniation  M51.27       2. Acute left-sided low back pain with left-sided sciatica  M54.42           Start Time: 0700  Stop Time: 0730  Total time in clinic (min): 30 minutes    Subjective: Patient reports that he has been feeling good. Virtually no leg pain since last week. Feeling some mild central lower back pain. HEP going well, continues throughout the day. 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       Objective: See treatment diary below    SLR and slump neg  Myotomes normal b/l  LS AROM WNL, 10-15% limitation in flexion compared to normal    Assessment: Tolerated treatment well. Patient continues to progress very well. Educated on regular performance of LS ext, cautious of prolonged flexion. Lighting via squat.       Plan: Continue per plan of care.      Precautions: CA      Manuals          LS UL PA L    AB G3-4         assessment    3'                                   Neuro Re-Ed             Sciatic nerve tensioner    10x                                                                                       Ther Ex          assessment   5' 5'         EIL 30\"x2            REIL 2x10 2x10  10x 10x         REIL + pt. OP 2x5 at end of REIL   10x         REIL + PT OP 10x  2x10 10x         Education time 5'            ROXANA  3x10                        RFIS  5x           Ther Activity             Education  Regarding centralization, recovery of function 10'  10' as noted above                      Gait Training                                       Modalities                                                "

## 2024-12-11 ENCOUNTER — APPOINTMENT (OUTPATIENT)
Dept: PHYSICAL THERAPY | Facility: REHABILITATION | Age: 49
End: 2024-12-11
Payer: COMMERCIAL

## 2024-12-18 ENCOUNTER — OFFICE VISIT (OUTPATIENT)
Dept: PHYSICAL THERAPY | Facility: REHABILITATION | Age: 49
End: 2024-12-18
Payer: COMMERCIAL

## 2024-12-18 DIAGNOSIS — M51.27 LUMBOSACRAL DISC HERNIATION: Primary | ICD-10-CM

## 2024-12-18 DIAGNOSIS — M54.42 ACUTE LEFT-SIDED LOW BACK PAIN WITH LEFT-SIDED SCIATICA: ICD-10-CM

## 2024-12-18 PROCEDURE — 97140 MANUAL THERAPY 1/> REGIONS: CPT | Performed by: PHYSICAL THERAPIST

## 2024-12-18 PROCEDURE — 97110 THERAPEUTIC EXERCISES: CPT | Performed by: PHYSICAL THERAPIST

## 2024-12-18 NOTE — PROGRESS NOTES
PT Re-eval and Discharge     Today's date: 2024  Patient name: Ihsan Gomez  : 1975  MRN: [id number]  Referring provider: Luis Church  Dx:   Encounter Diagnosis     ICD-10-CM    1. Lumbosacral disc herniation  M51.27       2. Acute left-sided low back pain with left-sided sciatica  M54.42           Start Time: 0820  Stop Time: 0845  Total time in clinic (min): 25 minutes    Assessment    Assessment details: Patient is a 49 y.o. year old male who attended physical therapy for 5 treatment sessions regarding left sided radicular LBP. Patient reports 90-95% improvement at this time which correlates to improved impairments and functionality.  Patient has shown improvement throughout PT by demonstrating decreased pain, increased range of motion, increased strength, and improved tolerance to activity.      Will DC PT at this time.     Patient has progressed well with PT, continues to manage symptoms with ROXANA/L. Educated on importance of continued HEP and reductive techniques.       Understanding of Dx/Px/POC: good     Prognosis: good    Plan    Treatment plan discussed with: patient        Subjective Evaluation    History of Present Illness  Mechanism of injury: Eval:    Ihsan is a 49 y.o. male presenting to physical therapy on 24 with referral from MD for acute on chronic left sided LBP with radiating pain. 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 He states that he denies a specific ROWDY. He reports that he was at work in the morning when walking when he noticed a tightening of his low back. He states that his pain began I his lower back and worked his way into his leg, calf and foot. His CC is is buttock and posterior thigh pain at this time. He will get some       Patient symptoms are constant but will fluctuate. Pain will increase at random, sometimes when lying, sitting. Better standing and walking. Feels best when reclined.  Pain is improving    Patient  denies bowel and bladder changes (denies urinary retention)/saddle numbness  (-) pain with cough/sneeze    Patient Goals  Patient goals for therapy: decreased pain and return to work    Pain  Current pain ratin  At worst pain ratin  Location: see above but currenty primarliy thigh posterior and buttock  Quality: sharp      Diagnostic Tests  MRI studies: abnormal  Treatments  Previous treatment: medication    Short Term Goals:   1. Patient will be Independent with hep  2. Patient will improve pain with activity by 50%  3. Patient will report GROC 50% or greater    MET      Long Term Goals:   1. Patient will improve FOTO to greater then goal  2. Patient will improve pain with activity to 2/10 or less  3. Patient will continue with HEP independence to allow for decreased future reoccurrence of pain and loss in function  4. Patient will report GROC 75% or greater  5. Patient will have normalized LLE myotomes  6. Patient will have pain free LS AROM performed multiple reps into each dir.  7. Patient will return to all work activity with lift pfree    MET      Objective    Posture: neutral/flat LS    Myotomes (L/R): decreased L5/S1 (ankle eversion)  Dermatome: (pinprick- L/R):  Decreased L5 (lateral shin more proximal to knee)     Reflexes:  (L/R) L3-4:   2+ bl     S1:     2+ bl          Clonus= neg    GAIT: decreased nicola    Lumbar  % of normal   Flex. 75   Extn. 75   SG Left 75   SG Right 75   ROT Left 75   ROT Right 75           MMT         AROM          PROM    Hip       L       R        L           R      L     R   Flex.         Extn.         Abd.         Add.         IR.         ER.                  G. Max         G. Med.         Iliop.             Neuro Dynamic Testing:  Slump and SLR neg                    Precautions: CA      Manuals             Assessment time 15'                                                   Neuro Re-Ed                                                                                                         Ther Ex 12/18            EIL 5x            REIL             REIL + pt. OP             REIL + PT OP             Education time 10'                                                   Ther Activity                                       Gait Training                                       Modalities

## 2025-04-03 PROCEDURE — 87205 SMEAR GRAM STAIN: CPT | Performed by: PHYSICIAN ASSISTANT

## 2025-04-03 PROCEDURE — 87077 CULTURE AEROBIC IDENTIFY: CPT | Performed by: PHYSICIAN ASSISTANT

## 2025-04-03 PROCEDURE — 87102 FUNGUS ISOLATION CULTURE: CPT | Performed by: PHYSICIAN ASSISTANT

## 2025-04-03 PROCEDURE — 87107 FUNGI IDENTIFICATION MOLD: CPT | Performed by: PHYSICIAN ASSISTANT

## 2025-04-03 PROCEDURE — 87070 CULTURE OTHR SPECIMN AEROBIC: CPT | Performed by: PHYSICIAN ASSISTANT
